# Patient Record
Sex: FEMALE | Race: WHITE | Employment: OTHER | URBAN - METROPOLITAN AREA
[De-identification: names, ages, dates, MRNs, and addresses within clinical notes are randomized per-mention and may not be internally consistent; named-entity substitution may affect disease eponyms.]

---

## 2018-08-27 RX ORDER — ZOLPIDEM TARTRATE 5 MG/1
TABLET ORAL
COMMUNITY
End: 2018-09-04 | Stop reason: SDUPTHER

## 2018-08-27 RX ORDER — ASPIRIN 81 MG/1
TABLET ORAL DAILY
COMMUNITY
End: 2019-06-12

## 2018-08-27 RX ORDER — IPRATROPIUM BROMIDE AND ALBUTEROL SULFATE 2.5; .5 MG/3ML; MG/3ML
3 SOLUTION RESPIRATORY (INHALATION)
COMMUNITY
End: 2018-12-03 | Stop reason: ALTCHOICE

## 2018-08-27 RX ORDER — AMLODIPINE BESYLATE 5 MG/1
5 TABLET ORAL DAILY
COMMUNITY
End: 2018-12-03

## 2018-08-27 RX ORDER — OMEPRAZOLE 20 MG/1
20 CAPSULE, DELAYED RELEASE ORAL DAILY
COMMUNITY
End: 2018-12-03

## 2018-09-04 ENCOUNTER — OFFICE VISIT (OUTPATIENT)
Dept: FAMILY MEDICINE CLINIC | Age: 83
End: 2018-09-04

## 2018-09-04 VITALS
RESPIRATION RATE: 16 BRPM | TEMPERATURE: 97.6 F | OXYGEN SATURATION: 5 % | SYSTOLIC BLOOD PRESSURE: 136 MMHG | DIASTOLIC BLOOD PRESSURE: 70 MMHG | HEART RATE: 68 BPM | WEIGHT: 116 LBS | BODY MASS INDEX: 22.78 KG/M2 | HEIGHT: 60 IN

## 2018-09-04 DIAGNOSIS — E03.9 ACQUIRED HYPOTHYROIDISM: ICD-10-CM

## 2018-09-04 DIAGNOSIS — G47.09 OTHER INSOMNIA: ICD-10-CM

## 2018-09-04 DIAGNOSIS — Z00.00 PREVENTATIVE HEALTH CARE: ICD-10-CM

## 2018-09-04 DIAGNOSIS — E78.5 HYPERLIPIDEMIA, UNSPECIFIED HYPERLIPIDEMIA TYPE: ICD-10-CM

## 2018-09-04 DIAGNOSIS — I10 ESSENTIAL HYPERTENSION: Primary | ICD-10-CM

## 2018-09-04 RX ORDER — ZOLPIDEM TARTRATE 5 MG/1
5 TABLET ORAL
Qty: 30 TAB | Refills: 2 | Status: SHIPPED | OUTPATIENT
Start: 2018-09-04 | End: 2018-12-03 | Stop reason: SDUPTHER

## 2018-09-04 RX ORDER — AMLODIPINE BESYLATE 10 MG/1
10 TABLET ORAL ONCE
COMMUNITY
Start: 2018-08-18 | End: 2021-02-08

## 2018-09-04 RX ORDER — ZOLPIDEM TARTRATE 5 MG/1
TABLET ORAL
COMMUNITY
Start: 2017-05-11 | End: 2018-12-03 | Stop reason: SDUPTHER

## 2018-09-04 RX ORDER — GUAIFENESIN 100 MG/5ML
81 LIQUID (ML) ORAL
COMMUNITY
End: 2018-12-03 | Stop reason: SDUPTHER

## 2018-09-04 RX ORDER — LEVOTHYROXINE SODIUM 25 UG/1
25 TABLET ORAL ONCE
COMMUNITY
Start: 2017-03-08 | End: 2019-04-16 | Stop reason: SDUPTHER

## 2018-09-04 NOTE — PROGRESS NOTES
Kamilah Braga is a 80 y.o. female Chief Complaint Patient presents with  Complete Physical  
 Medication Refill 1. Have you been to the ER, urgent care clinic since your last visit? Hospitalized since your last visit?  no 
 
2. Have you seen or consulted any other health care providers outside of the [address] [place] [address] since your last visit? Include any pap smears or colon screening. [facility])

## 2018-09-04 NOTE — PROGRESS NOTES
[address] [address], [address] Savannah Watters. Hali [place] Phone:  [phone] Fax:  [phone] Annual Wellness Name:  Joe Saleh :  1923 Encounter Date:  2018 HPI: 
Joe Saleh is a 80 y.o. female who presents today for an annual exam to review medications, order labs, discuss if there is any chest pain or shortness of breath, review life style issues and undergo age appropriate screening updates and physical exam and refill medications as appropriate. History reviewed. No pertinent past medical history. Past Surgical History:  
Procedure Laterality Date  HX CHOLECYSTECTOMY  HX GYN Family History Problem Relation Age of Onset  Heart Disease Mother  Stroke Father  Cancer Sister Social History Substance Use Topics  Smoking status: Never Smoker  Smokeless tobacco: Never Used  Alcohol use 0.6 oz/week 1 Glasses of wine per week Current Outpatient Prescriptions Medication Sig Dispense Refill  amLODIPine (NORVASC) 10 mg tablet Take 10 mg by mouth once.  levothyroxine (SYNTHROID) 25 mcg tablet Take 25 mcg by mouth once.  aspirin 81 mg chewable tablet Take 81 mg by mouth.  zolpidem (AMBIEN) 5 mg tablet Take 1 Tab by mouth nightly. Max Daily Amount: 5 mg. 30 Tab 2  
 zolpidem (AMBIEN) 5 mg tablet TK 1 T PO  QHS PRN    
 acetaminophen (TYLENOL 8 HOUR PO) Take  by mouth. Indications: as needed  omeprazole (PRILOSEC) 20 mg capsule Take 20 mg by mouth daily.  dilTIAZem ER (CARDIZEM LA) 120 mg tablet Take 120 mg by mouth daily.  albuterol-ipratropium (DUO-NEB) 2.5 mg-0.5 mg/3 ml nebu 3 mL by Nebulization route.  aspirin delayed-release 81 mg tablet Take  by mouth daily.  amLODIPine (NORVASC) 5 mg tablet Take 5 mg by mouth daily. Allergies Allergen Reactions  Ace Inhibitors Unknown (comments) None noted Patient Active Problem List  
Diagnosis Code  Other insomnia G47.09  
 Essential hypertension I10  
 Acquired hypothyroidism E03.9 Review of Systems: 
Review of Systems Constitutional: Negative for fever. Respiratory: Negative for shortness of breath. Cardiovascular: Negative for chest pain, orthopnea and PND. Gastrointestinal: Negative for abdominal pain, nausea and vomiting. Diet, Lifestyle: No special diet Exercise level: moderately active Physical Exam: 
Physical Exam  
Constitutional: She is oriented to person, place, and time. She appears well-developed and well-nourished. HENT:  
Head: Normocephalic. Eyes: Pupils are equal, round, and reactive to light. Neck: Normal range of motion. Neck supple. No thyromegaly present. Cardiovascular: Normal rate, regular rhythm and normal heart sounds. Exam reveals no friction rub. Pulmonary/Chest: Effort normal and breath sounds normal. No respiratory distress. She has no wheezes. She has no rales. She exhibits no tenderness. Abdominal: Soft. Bowel sounds are normal. She exhibits no distension. There is no tenderness. There is no guarding. Musculoskeletal: Normal range of motion. Neurological: She is alert and oriented to person, place, and time. She has normal reflexes. Skin: Skin is warm and dry. Psychiatric: She has a normal mood and affect. Thought content normal.  
 
 
 
Assessment/Plan Health is stable, life style good, immunizations reviewed and screening discussed and done as per patient's desires. Exercise includes 4 components:  Stretching, core muscle, cardiovascular and balance techniques. Good posture is protective to your back - stand straight as much as possible. Exercise daily of 40 minutes of active exercise encouraged, a balanced diet with portions of fruits, vegetables and salads recommended. Watch sodium intake if high blood pressure is an issue. Labs ordered. If results are not mailed or phoned to you within 2 weeks, please call us. Diagnoses and all orders for this visit: 1. Essential hypertension -     METABOLIC PANEL, COMPREHENSIVE 2. Acquired hypothyroidism 
-     TSH 3RD GENERATION 3. Other insomnia 
-     zolpidem (AMBIEN) 5 mg tablet; Take 1 Tab by mouth nightly. Max Daily Amount: 5 mg. 4. Preventative health care -     CBC W/O DIFF 
-     URINALYSIS W/ RFLX MICROSCOPIC 5. Hyperlipidemia, unspecified hyperlipidemia type -     LIPID PANEL Orders Placed This Encounter  CBC W/O DIFF  
 URINALYSIS W/ RFLX MICROSCOPIC  METABOLIC PANEL, COMPREHENSIVE  
 TSH 3RD GENERATION  
 LIPID PANEL  
 zolpidem (AMBIEN) 5 mg tablet Health Maintenance Due Topic Date Due  
 DTaP/Tdap/Td series (1 - Tdap) 11/06/1944  ZOSTER VACCINE AGE 60>  09/06/1983  GLAUCOMA SCREENING Q2Y  11/06/1988  Pneumococcal 65+ Low/Medium Risk (1 of 2 - PCV13) 11/06/1988  Influenza Age 5 to Adult  08/01/2018

## 2018-09-04 NOTE — PATIENT INSTRUCTIONS
Medicare Wellness Visit, Female The best way to live healthy is to have a lifestyle where you eat a well-balanced diet, exercise regularly, limit alcohol use, and quit all forms of tobacco/nicotine, if applicable. Regular preventive services are another way to keep healthy. Preventive services (vaccines, screening tests, monitoring & exams) can help personalize your care plan, which helps you manage your own care. Screening tests can find health problems at the earliest stages, when they are easiest to treat. [facility] follows the current, evidence-based guidelines published by the [facility] ([facility]) when recommending preventive services for our patients. Because we follow these guidelines, sometimes recommendations change over time as research supports it. (For example, mammograms used to be recommended annually. Even though Medicare will still pay for an annual mammogram, the newer guidelines recommend a mammogram every two years for women of average risk.) Of course, you and your doctor may decide to screen more often for some diseases, based on your risk and your health status. Preventive services for you include: - Medicare offers their members a free annual wellness visit, which is time for you and your primary care provider to discuss and plan for your preventive service needs. Take advantage of this benefit every year! 
-All adults over the age of 72 should receive the recommended pneumonia vaccines. Current USPSTF guidelines recommend a series of two vaccines for the best pneumonia protection.  
-All adults should have a flu vaccine yearly and a tetanus vaccine every 10 years. All adults age 61 and older should receive a shingles vaccine once in their lifetime.   
-A bone mass density test is recommended when a woman turns 65 to screen for osteoporosis. This test is only recommended one time, as a screening. Some providers will use this same test as a disease monitoring tool if you already have osteoporosis. -All adults age 38-68 who are overweight should have a diabetes screening test once every three years.  
-Other screening tests and preventive services for persons with diabetes include: an eye exam to screen for diabetic retinopathy, a kidney function test, a foot exam, and stricter control over your cholesterol.  
-Cardiovascular screening for adults with routine risk involves an electrocardiogram (ECG) at intervals determined by your doctor.  
-Colorectal cancer screenings should be done for adults age 54-65 with no increased risk factors for colorectal cancer. There are a number of acceptable methods of screening for this type of cancer. Each test has its own benefits and drawbacks. Discuss with your doctor what is most appropriate for you during your annual wellness visit. The different tests include: colonoscopy (considered the best screening method), a fecal occult blood test, a fecal DNA test, and sigmoidoscopy. -Breast cancer screenings are recommended every other year for women of normal risk, age 54-69. 
-Cervical cancer screenings for women over age 72 are only recommended with certain risk factors.  
-All adults born between [facility] should be screened once for Hepatitis C. Here is a list of your current Health Maintenance items (your personalized list of preventive services) with a due date: 
Health Maintenance Due Topic Date Due  
 DTaP/Tdap/Td  (1 - Tdap) 11/06/1944  Shingles Vaccine  09/06/1983  Glaucoma Screening   11/06/1988  Pneumococcal Vaccine (1 of 2 - PCV13) 11/06/1988  Flu Vaccine  08/01/2018

## 2018-09-07 LAB
ALBUMIN SERPL-MCNC: 4.3 G/DL (ref 3.2–4.6)
ALBUMIN/GLOB SERPL: 1.7 {RATIO} (ref 1.2–2.2)
ALP SERPL-CCNC: 85 IU/L (ref 39–117)
ALT SERPL-CCNC: 19 IU/L (ref 0–32)
APPEARANCE UR: CLEAR
AST SERPL-CCNC: 21 IU/L (ref 0–40)
BACTERIA #/AREA URNS HPF: ABNORMAL /[HPF]
BILIRUB SERPL-MCNC: 0.7 MG/DL (ref 0–1.2)
BILIRUB UR QL STRIP: NEGATIVE
BUN SERPL-MCNC: 14 MG/DL (ref 10–36)
BUN/CREAT SERPL: 25 (ref 12–28)
CALCIUM SERPL-MCNC: 9.4 MG/DL (ref 8.7–10.3)
CASTS URNS QL MICRO: ABNORMAL /LPF
CHLORIDE SERPL-SCNC: 99 MMOL/L (ref 96–106)
CHOLEST SERPL-MCNC: 140 MG/DL (ref 100–199)
CO2 SERPL-SCNC: 26 MMOL/L (ref 20–29)
COLOR UR: YELLOW
CREAT SERPL-MCNC: 0.56 MG/DL (ref 0.57–1)
CRYSTALS URNS MICRO: ABNORMAL
EPI CELLS #/AREA URNS HPF: ABNORMAL /HPF
ERYTHROCYTE [DISTWIDTH] IN BLOOD BY AUTOMATED COUNT: 13.7 % (ref 12.3–15.4)
GLOBULIN SER CALC-MCNC: 2.5 G/DL (ref 1.5–4.5)
GLUCOSE SERPL-MCNC: 88 MG/DL (ref 65–99)
GLUCOSE UR QL: NEGATIVE
HCT VFR BLD AUTO: 40.4 % (ref 34–46.6)
HDLC SERPL-MCNC: 41 MG/DL
HGB BLD-MCNC: 13.1 G/DL (ref 11.1–15.9)
HGB UR QL STRIP: NEGATIVE
KETONES UR QL STRIP: NEGATIVE
LDLC SERPL CALC-MCNC: 66 MG/DL (ref 0–99)
LEUKOCYTE ESTERASE UR QL STRIP: ABNORMAL
MCH RBC QN AUTO: 30.1 PG (ref 26.6–33)
MCHC RBC AUTO-ENTMCNC: 32.4 G/DL (ref 31.5–35.7)
MCV RBC AUTO: 93 FL (ref 79–97)
MICRO URNS: ABNORMAL
MUCOUS THREADS URNS QL MICRO: PRESENT
NITRITE UR QL STRIP: NEGATIVE
PH UR STRIP: 7 [PH] (ref 5–7.5)
PLATELET # BLD AUTO: 327 X10E3/UL (ref 150–379)
POTASSIUM SERPL-SCNC: 4.2 MMOL/L (ref 3.5–5.2)
PROT SERPL-MCNC: 6.8 G/DL (ref 6–8.5)
PROT UR QL STRIP: NEGATIVE
RBC # BLD AUTO: 4.35 X10E6/UL (ref 3.77–5.28)
RBC #/AREA URNS HPF: ABNORMAL /HPF
SODIUM SERPL-SCNC: 141 MMOL/L (ref 134–144)
SP GR UR: 1.01 (ref 1–1.03)
TRIGL SERPL-MCNC: 166 MG/DL (ref 0–149)
TSH SERPL DL<=0.005 MIU/L-ACNC: 3.13 UIU/ML (ref 0.45–4.5)
UNIDENT CRYS URNS QL MICRO: PRESENT
UROBILINOGEN UR STRIP-MCNC: 0.2 MG/DL (ref 0.2–1)
VLDLC SERPL CALC-MCNC: 33 MG/DL (ref 5–40)
WBC # BLD AUTO: 6.5 X10E3/UL (ref 3.4–10.8)
WBC #/AREA URNS HPF: ABNORMAL /HPF

## 2018-09-11 ENCOUNTER — TELEPHONE (OUTPATIENT)
Dept: FAMILY MEDICINE CLINIC | Age: 83
End: 2018-09-11

## 2018-09-11 NOTE — TELEPHONE ENCOUNTER
Need new order for DEXA with diagnosis code on it sent to [facility]. Per daughter please call pt to confirm it has been done @ [phone].

## 2018-09-28 ENCOUNTER — TELEPHONE (OUTPATIENT)
Dept: FAMILY MEDICINE CLINIC | Age: 83
End: 2018-09-28

## 2018-12-03 ENCOUNTER — OFFICE VISIT (OUTPATIENT)
Dept: FAMILY MEDICINE CLINIC | Age: 83
End: 2018-12-03

## 2018-12-03 VITALS
SYSTOLIC BLOOD PRESSURE: 146 MMHG | HEART RATE: 62 BPM | TEMPERATURE: 97.4 F | RESPIRATION RATE: 16 BRPM | HEIGHT: 60 IN | BODY MASS INDEX: 22.58 KG/M2 | WEIGHT: 115 LBS | DIASTOLIC BLOOD PRESSURE: 75 MMHG | OXYGEN SATURATION: 98 %

## 2018-12-03 DIAGNOSIS — I10 ESSENTIAL HYPERTENSION: ICD-10-CM

## 2018-12-03 DIAGNOSIS — G47.09 OTHER INSOMNIA: Primary | ICD-10-CM

## 2018-12-03 RX ORDER — FAMOTIDINE 20 MG/1
10 TABLET, FILM COATED ORAL
COMMUNITY

## 2018-12-03 RX ORDER — ZOLPIDEM TARTRATE 5 MG/1
5 TABLET ORAL
Qty: 30 TAB | Refills: 2 | Status: SHIPPED | OUTPATIENT
Start: 2018-12-03 | End: 2019-03-04 | Stop reason: SDUPTHER

## 2018-12-03 RX ORDER — IBUPROFEN 200 MG
400 TABLET ORAL
COMMUNITY
End: 2018-12-03

## 2018-12-03 RX ORDER — ACETAMINOPHEN 325 MG/1
650 TABLET ORAL
COMMUNITY
End: 2021-01-12

## 2018-12-03 RX ORDER — COLESEVELAM 180 1/1
1350 TABLET ORAL
COMMUNITY
Start: 2017-02-26 | End: 2018-12-03

## 2018-12-03 RX ORDER — LOPERAMIDE HYDROCHLORIDE 2 MG/1
2 CAPSULE ORAL
COMMUNITY
End: 2018-12-03

## 2018-12-03 NOTE — PROGRESS NOTES
I have reviewed the notes, assessments, and/or procedures performed, I concur with the residents documentation of [place].

## 2018-12-03 NOTE — PROGRESS NOTES
Misael Rene is a 80 y.o. female who had concerns including Blood Pressure Check (hypertension) and Medication Refill Tea Garner). Insomnia  Patient presents today for Insomnia. Patient currently taking Ambien 5mg nightly. She has had insomnia for many years following the death of her  and son. She takes medication consistently and as prescried. She usually gets about 7 hours of sleep. She usually goes to bed about 11pm and gets up around 7 pm. She has trouble initiating sleep and occasionally wakes up at night. HTN  Patient presents today for HTN follow-up. Currently taking Norvasc 10mg Taking medications daily w/o complications. She does not check her blood pressure at home. She does follow-up with cardiology for her a.fib. .       ROS: (positive in bold)  General: wt loss, fever, chills, fatigue   Cardiac: chest pain  Pul: SOB  GI: abdominal pain, N&V, diarrhea, constipation   Neuro:  headache, lightheaded, dizziness. Psych: anxiety, depression, Insomnia    Past Medical History:  Past Medical History:   Diagnosis Date    Anxiety     [facility]rial [facility] [address]     Dysphagia     Insomnia     contract signed 11/2017    Macular degeneration     Osteoarthritis     Pancreatitis 2010    Pneumonia 2016    PVD (peripheral vascular disease) ([facility] [address]     SIADH (syndrome of inappropriate ADH production) ([facility])     Vertigo        Past Surgical History:  Past Surgical History:   Procedure Laterality Date    HX CHOLECYSTECTOMY      HX GYN         Family History:  Family History   Problem Relation Age of Onset    Heart Disease Mother     Stroke Father     Cancer Sister        Allergies: Allergies   Allergen Reactions    Lisinopril Other (comments)     Edema      Ace Inhibitors Unknown (comments)     None noted       Social History:  Social History     Tobacco Use    Smoking status: Never Smoker    Smokeless tobacco: Never Used   Substance Use Topics    Alcohol use:  Yes     Alcohol/week: 0.6 oz Types: 1 Glasses of wine per week    Drug use: No       Current Meds:  Current Outpatient Medications on File Prior to Visit   Medication Sig Dispense Refill    famotidine (PEPCID) 20 mg tablet 20 mg.      acetaminophen (TYLENOL) 325 mg tablet 650 mg.      amLODIPine (NORVASC) 10 mg tablet Take 10 mg by mouth once.  levothyroxine (SYNTHROID) 25 mcg tablet Take 25 mcg by mouth once.  aspirin delayed-release 81 mg tablet Take  by mouth daily. No current facility-administered medications on file prior to visit. Visit Vitals  /75 (BP 1 Location: Right arm, BP Patient Position: Sitting)   Pulse 62   Temp 97.4 °F (36.3 °C) (Oral)   Resp 16   Ht 5' (1.524 m)   Wt 115 lb (52.2 kg)   LMP  (LMP Unknown)   SpO2 98%   BMI 22.46 kg/m²       Gen:  Well developed, well nourished female in no acute distress  HEENT: normocephalic/atraumatic;EOMi  Neck:   Supple, no lympadenopathy, no thyromegaly  Card:  RRR, no m/r/g  Chest:  CTAB, no w/r/r  Abd:  BS+, Soft, nontender/nondistended  Neuro: AAO X 3  Psych:  Nl mood and affect     Assessment:      ICD-10-CM ICD-9-CM    1. Other insomnia G47.09 780.52 zolpidem (AMBIEN) 5 mg tablet   2. Essential hypertension I10 401.9       1. Insomina  Currently on Ambien 5mg nightly. Taking medication as prescribed. Helps her initiate sleep.  was reviewed and appropirate w/o red flags. Discussed side effects of medication. Will consider slowly weaning in the future. Refill of Ambien 5mg with 2 refills. 2. HTN  Stable for age. Continue current medications. No changes at this time. Labs checked in 9/18 and stable. BP goal discussed with patient. Will continue to monitor. I have discussed the diagnosis with the patient and the intended plan as seen in the above orders. The patient has received an after-visit summary and questions were answered concerning future plans. I have discussed medication side effects and warnings with the patient as well.  The patient agrees and understands above plan. Follow-up Disposition:  Return in about 3 months (around 3/3/2019) for Insomnia. Patient discussed with supervising attending.     Siddharth Linares, DO

## 2018-12-03 NOTE — PATIENT INSTRUCTIONS

## 2018-12-03 NOTE — PROGRESS NOTES
Identified pt with two pt identifiers(name and ). Chief Complaint   Patient presents with    Blood Pressure Check     hypertension    Medication Refill        Health Maintenance Due   Topic    DTaP/Tdap/Td series (1 - Tdap)    Shingrix Vaccine Age 50> (1 of 2)    GLAUCOMA SCREENING Q2Y     Pneumococcal 65+ Low/Medium Risk (1 of 2 - PCV13)       Wt Readings from Last 3 Encounters:   18 115 lb (52.2 kg)   18 116 lb (52.6 kg)     Temp Readings from Last 3 Encounters:   18 97.6 °F (36.4 °C) (Oral)     BP Readings from Last 3 Encounters:   18 136/70     Pulse Readings from Last 3 Encounters:   18 68         Learning Assessment:  :     Learning Assessment 2018   PRIMARY LEARNER Patient   HIGHEST LEVEL OF EDUCATION - PRIMARY LEARNER  4 YEARS OF COLLEGE   PRIMARY LANGUAGE ENGLISH   LEARNER PREFERENCE PRIMARY READING   ANSWERED BY self   RELATIONSHIP SELF       Depression Screening:  :     PHQ over the last two weeks 2018   Little interest or pleasure in doing things Not at all   Feeling down, depressed, irritable, or hopeless Not at all   Total Score PHQ 2 0       Fall Risk Assessment:  :     Fall Risk Assessment, last 12 mths 2018   Able to walk? Yes   Fall in past 12 months? No       Abuse Screening:  :     Abuse Screening Questionnaire 2018   Do you ever feel afraid of your partner? N   Are you in a relationship with someone who physically or mentally threatens you? N       Coordination of Care Questionnaire:  :     1) Have you been to an emergency room, urgent care clinic since your last visit? no   Hospitalized since your last visit? no             2) Have you seen or consulted any other health care providers outside of [address] [place] [address] since your last visit? no  (Include any pap smears or colon screenings in this section.)    3) Do you have an Advance Directive on file? no  Are you interested in receiving information about Advance Directives? no    Patient is accompanied by self I have received verbal consent from Sukhdev Gray to discuss any/all medical information while they are present in the room. Reviewed record in preparation for visit and have obtained necessary documentation. Medication reconciliation up to date and corrected with patient at this time.

## 2019-03-04 ENCOUNTER — OFFICE VISIT (OUTPATIENT)
Dept: FAMILY MEDICINE CLINIC | Age: 84
End: 2019-03-04

## 2019-03-04 VITALS
DIASTOLIC BLOOD PRESSURE: 74 MMHG | HEART RATE: 62 BPM | TEMPERATURE: 98.2 F | RESPIRATION RATE: 18 BRPM | SYSTOLIC BLOOD PRESSURE: 152 MMHG | OXYGEN SATURATION: 97 % | WEIGHT: 114 LBS | BODY MASS INDEX: 22.38 KG/M2 | HEIGHT: 60 IN

## 2019-03-04 DIAGNOSIS — Z79.899 LONG-TERM USE OF HIGH-RISK MEDICATION: ICD-10-CM

## 2019-03-04 DIAGNOSIS — G47.09 OTHER INSOMNIA: Primary | ICD-10-CM

## 2019-03-04 DIAGNOSIS — I10 ESSENTIAL HYPERTENSION: ICD-10-CM

## 2019-03-04 RX ORDER — ZOLPIDEM TARTRATE 5 MG/1
5 TABLET ORAL
Qty: 30 TAB | Refills: 0 | Status: SHIPPED | OUTPATIENT
Start: 2019-05-04 | End: 2019-04-26 | Stop reason: SDUPTHER

## 2019-03-04 RX ORDER — ZOLPIDEM TARTRATE 5 MG/1
5 TABLET ORAL
Qty: 30 TAB | Refills: 0 | Status: SHIPPED | OUTPATIENT
Start: 2019-03-04 | End: 2019-04-03

## 2019-03-04 RX ORDER — ZOLPIDEM TARTRATE 5 MG/1
5 TABLET ORAL
Qty: 30 TAB | Refills: 0 | Status: SHIPPED | OUTPATIENT
Start: 2019-04-04 | End: 2019-05-04

## 2019-03-04 NOTE — PATIENT INSTRUCTIONS

## 2019-03-04 NOTE — PROGRESS NOTES
Sukhdev Lopez is a 80 y.o. female who had concerns including Hypertension (follow up) and Insomnia (ambien refill). Insomnia  Patient currently taking Ambien 5mg nightly. She has had insomnia for many years. She developed insomnia following the death of her  and son. She takes medication consistently and as prescribed. She reports 7-9 hours per sleep at night. She states that she has trouble initiating sleep and occasionally wakes up at night. HTN  Patient presents today for HTN follow-up. Currently taking Norvasc 10mg. Taking medications daily w/o complications. Home BP readings range from 130-167/67-78. Patient  trying to follow a low salt diet. ROS: (positive in bold)  General: wt loss, fever, chills, fatigue, insomnia   Cardiac: chest pain, palpitations  Pul: SOB  Neuro: headache, lightheaded, dizziness. Past Medical History: (reviewed)  Past Medical History:   Diagnosis Date    Anxiety     At[facility] fibrillation ([facility] [address]     Dysphagia     Insomnia     contract signed 11/2017    Macular degeneration     Osteoarthritis     Pancreatitis 2010    Pneumonia 2016    PVD (peripheral vascular disease) ([facility] [address]     SIADH (syndrome of inappropriate ADH production) ([facility])     Vertigo        Past Surgical History:  Past Surgical History:   Procedure Laterality Date    HX CHOLECYSTECTOMY      HX GYN         Family History:  Family History   Problem Relation Age of Onset    Heart Disease Mother     Stroke Father     Cancer Sister        Allergies: Allergies   Allergen Reactions    Lisinopril Other (comments)     Edema      Ace Inhibitors Unknown (comments)     None noted       Social History:  Social History     Tobacco Use    Smoking status: Never Smoker    Smokeless tobacco: Never Used   Substance Use Topics    Alcohol use:  Yes     Alcohol/week: 0.6 oz     Types: 1 Glasses of wine per week    Drug use: No       Current Meds:  Current Outpatient Medications on File Prior to Visit Medication Sig Dispense Refill    vit A/vit C/vit E/zinc/copper (PRESERVISION AREDS PO) Take  by mouth.  propylene glycol/peg 400/PF (SYSTANE, PF, OP) Apply  to eye.  MULTIVITAMIN PO Take  by mouth.  famotidine (PEPCID) 20 mg tablet 20 mg.      acetaminophen (TYLENOL) 325 mg tablet 650 mg.      amLODIPine (NORVASC) 10 mg tablet Take 10 mg by mouth once.  levothyroxine (SYNTHROID) 25 mcg tablet Take 25 mcg by mouth once.  aspirin delayed-release 81 mg tablet Take  by mouth daily. No current facility-administered medications on file prior to visit. Visit Vitals  /74 (BP 1 Location: Left arm, BP Patient Position: Sitting)   Pulse 62   Temp 98.2 °F (36.8 °C) (Oral)   Resp 18   Ht 5' (1.524 m)   Wt 114 lb (51.7 kg)   LMP  (LMP Unknown)   SpO2 97%   BMI 22.26 kg/m²       Gen:  Well developed, well nourished female in no acute distress  Card:  RRR, no m/r/g  Chest:  CTAB, no w/r/r  Abd:  BS+, Soft, nontender/nondistended  Psych:  Nl mood and affect     Assessment/Plan:      ICD-10-CM ICD-9-CM    1. Other insomnia G47.09 780.52 zolpidem (AMBIEN) 5 mg tablet      zolpidem (AMBIEN) 5 mg tablet      zolpidem (AMBIEN) 5 mg tablet      DRUG SCREEN, URINE - IMMUNOASSAY 9 W/REFLEX CONFIRM   2. Essential hypertension I10 401.9    3. Long-term use of high-risk medication Z79.899 V58.69 DRUG SCREEN, URINE - IMMUNOASSAY 9 W/REFLEX CONFIRM        Insomina  Currently on Ambien 5mg nightly. Taking medication as prescribed. Helps her initiate sleep.  was reviewed and appropirate w/o red flags. Discussed side effects of medication. Sleep hygiene discusses with patient. Will check UDS. Pain contract signed with Dr. Mahsa Reardon. Given her age she is at risk of adverse effects to medication. Will consider slowly weaning in the future. Refill of Ambien 5mg with 2 refills. Hypertension:  Stable for age. Continue current medications. No changes at this time. BP goal discussed with patient. Encouraged well balanced diet with low sodium intake, and setting a goal of 150 minutes of exercise per week. I have discussed the diagnosis with the patient and the intended plan as seen in the above orders. The patient has received an after-visit summary and questions were answered concerning future plans. I have discussed medication side effects and warnings with the patient as well. The patient agrees and understands above plan. Follow-up Disposition:  Return in about 3 months (around 6/4/2019). Patient discussed with supervising attending.     Lorraine Navarro DO

## 2019-03-25 ENCOUNTER — TELEPHONE (OUTPATIENT)
Dept: FAMILY MEDICINE CLINIC | Age: 84
End: 2019-03-25

## 2019-03-25 NOTE — TELEPHONE ENCOUNTER
Patient requesting prescription for Lomotil for severe diarrhea. Stating that over the counter medication is not helping her.

## 2019-04-16 RX ORDER — LEVOTHYROXINE SODIUM 25 UG/1
25 TABLET ORAL
Qty: 90 TAB | Refills: 0 | Status: SHIPPED | OUTPATIENT
Start: 2019-04-16 | End: 2019-07-12 | Stop reason: SDUPTHER

## 2019-04-26 ENCOUNTER — OFFICE VISIT (OUTPATIENT)
Dept: FAMILY MEDICINE CLINIC | Age: 84
End: 2019-04-26

## 2019-04-26 VITALS
HEART RATE: 59 BPM | TEMPERATURE: 98.1 F | RESPIRATION RATE: 16 BRPM | DIASTOLIC BLOOD PRESSURE: 70 MMHG | SYSTOLIC BLOOD PRESSURE: 156 MMHG | BODY MASS INDEX: 22.58 KG/M2 | OXYGEN SATURATION: 96 % | HEIGHT: 60 IN | WEIGHT: 115 LBS

## 2019-04-26 DIAGNOSIS — Z09 HOSPITAL DISCHARGE FOLLOW-UP: Primary | ICD-10-CM

## 2019-04-26 RX ORDER — ZOLPIDEM TARTRATE 5 MG/1
TABLET ORAL
COMMUNITY
End: 2019-04-26 | Stop reason: SDUPTHER

## 2019-04-26 RX ORDER — AMLODIPINE BESYLATE 10 MG/1
TABLET ORAL
COMMUNITY
End: 2019-04-26 | Stop reason: SDUPTHER

## 2019-04-26 RX ORDER — LEVOTHYROXINE SODIUM 25 UG/1
TABLET ORAL
COMMUNITY
End: 2019-04-26 | Stop reason: SDUPTHER

## 2019-04-26 RX ORDER — GUAIFENESIN 100 MG/5ML
LIQUID (ML) ORAL
COMMUNITY
End: 2019-04-26 | Stop reason: SDUPTHER

## 2019-04-26 NOTE — PROGRESS NOTES
Assessment/Plan:     Diagnoses and all orders for this visit:    1. Hospital discharge follow-up  - Improving since fall, minimal pain. Discussed the symptoms of concussion. If pain in the pelvis begins to increase consider PT. Discussed the reasons to come back to clinic. RTC in June as discussed            Discussed expected course/resolution/complications of diagnosis in detail with patient.    Medication risks/benefits/costs/interactions/alternatives discussed with patient.    Pt was given after visit summary which includes diagnoses, current medications & vitals. Pt expressed understanding with the diagnosis and plan        Subjective:      Quirino Guzman is a 80 y.o. female who presents for had concerns including Fall (Went to [address] [place] [address] 4/15/19  ( fracture tailbone ) ). Here today for follow up on fall. Carol Jimenez on 4/19/19, fell back on steps and hit her head. Went to ortho on call found to have some swelling on the outside of skull so sent to [address] [place] [address] ER.  CT of head was normal.  CT of abdomen ok, found fracture of coccyx, XR did not  the fracture. Given order for PT. Today she states doing much better, pain is less each day. She has not started PT yet. Current Outpatient Medications   Medication Sig Dispense Refill    levothyroxine (SYNTHROID) 25 mcg tablet Take 1 Tab by mouth Daily (before breakfast). 90 Tab 0    vit A/vit C/vit E/zinc/copper (PRESERVISION AREDS PO) Take  by mouth.  MULTIVITAMIN PO Take  by mouth.  zolpidem (AMBIEN) 5 mg tablet Take 1 Tab by mouth nightly as needed for Sleep for up to 30 days. Max Daily Amount: 5 mg. 30 Tab 0    famotidine (PEPCID) 20 mg tablet 20 mg.      acetaminophen (TYLENOL) 325 mg tablet 650 mg.      amLODIPine (NORVASC) 10 mg tablet Take 10 mg by mouth once.  aspirin delayed-release 81 mg tablet Take  by mouth daily.          Allergies   Allergen Reactions    Ace Inhibitors Unknown (comments), Hives and Rash     None noted  None noted  Edema    Lisinopril Other (comments)     Edema         ROS:   Review of Systems   Constitutional: Negative for fever and malaise/fatigue. Respiratory: Negative for cough and shortness of breath. Cardiovascular: Negative for chest pain, palpitations and leg swelling. Musculoskeletal: Negative for back pain, falls and joint pain. Neurological: Negative for dizziness and headaches. Objective:     Visit Vitals  /70 (BP 1 Location: Right arm, BP Patient Position: Sitting)   Pulse (!) 59   Temp 98.1 °F (36.7 °C) (Oral)   Resp 16   Ht 5' (1.524 m)   Wt 115 lb (52.2 kg)   LMP  (LMP Unknown)   SpO2 96%   BMI 22.46 kg/m²       Vitals and Nurse Documentation reviewed. Physical Exam   Constitutional: She is well-developed, well-nourished, and in no distress. No distress. HENT:   Head: Normocephalic and atraumatic. Cardiovascular: Normal rate, regular rhythm and normal heart sounds. Exam reveals no gallop and no friction rub. No murmur heard. Pulmonary/Chest: Effort normal and breath sounds normal. No respiratory distress. She has no wheezes. She has no rales. Neurological: She is alert. Skin: She is not diaphoretic.    Psychiatric: Affect normal.       Results for orders placed or performed in visit on 09/04/18   CBC W/O DIFF   Result Value Ref Range    WBC 6.5 3.4 - 10.8 x10E3/uL    RBC 4.35 3.77 - 5.28 x10E6/uL    HGB 13.1 11.1 - 15.9 g/dL    HCT 40.4 34.0 - 46.6 %    MCV 93 79 - 97 fL    MCH 30.1 26.6 - 33.0 pg    MCHC 32.4 31.5 - 35.7 g/dL    RDW 13.7 12.3 - 15.4 %    PLATELET 009 255 - 800 x10E3/uL   URINALYSIS W/ RFLX MICROSCOPIC   Result Value Ref Range    Specific Gravity 1.011 1.005 - 1.030    pH (UA) 7.0 5.0 - 7.5    Color Yellow Yellow    Appearance Clear Clear    Leukocyte Esterase 3+ (A) Negative    Protein Negative Negative/Trace    Glucose Negative Negative    Ketone Negative Negative    Blood Negative Negative    Bilirubin Negative Negative    Urobilinogen 0.2 0.2 - 1.0 mg/dL    Nitrites Negative Negative    Microscopic Examination See additional order    METABOLIC PANEL, COMPREHENSIVE   Result Value Ref Range    Glucose 88 65 - 99 mg/dL    BUN 14 10 - 36 mg/dL    Creatinine 0.56 (L) 0.57 - 1.00 mg/dL    GFR est non-AA 80 >59 mL/min/1.73    GFR est AA 92 >59 mL/min/1.73    BUN/Creatinine ratio 25 12 - 28    Sodium 141 134 - 144 mmol/L    Potassium 4.2 3.5 - 5.2 mmol/L    Chloride 99 96 - 106 mmol/L    CO2 26 20 - 29 mmol/L    Calcium 9.4 8.7 - 10.3 mg/dL    Protein, total 6.8 6.0 - 8.5 g/dL    Albumin 4.3 3.2 - 4.6 g/dL    GLOBULIN, TOTAL 2.5 1.5 - 4.5 g/dL    A-G Ratio 1.7 1.2 - 2.2    Bilirubin, total 0.7 0.0 - 1.2 mg/dL    Alk. phosphatase 85 39 - 117 IU/L    AST (SGOT) 21 0 - 40 IU/L    ALT (SGPT) 19 0 - 32 IU/L   TSH 3RD GENERATION   Result Value Ref Range    TSH 3.130 0.450 - 4.500 uIU/mL   LIPID PANEL   Result Value Ref Range    Cholesterol, total 140 100 - 199 mg/dL    Triglyceride 166 (H) 0 - 149 mg/dL    HDL Cholesterol 41 >39 mg/dL    VLDL, calculated 33 5 - 40 mg/dL    LDL, calculated 66 0 - 99 mg/dL   MICROSCOPIC EXAMINATION   Result Value Ref Range    WBC 11-30 (A) 0 - 5 /hpf    RBC 0-2 0 - 2 /hpf    Epithelial cells 0-10 0 - 10 /hpf    Casts None seen None seen /lpf    Crystals Present (A) N/A    Crystal type Amorphous Sediment N/A    Mucus Present Not Estab.     Bacteria Few None seen/Few

## 2019-04-26 NOTE — PROGRESS NOTES
Elliot Pacheco is a 80 y.o. female      Chief Complaint   Patient presents with    Amanda Jennings to [address] [place] [address] 4/15/19  ( fracture tailbone )          1. Have you been to the ER, urgent care clinic since your last visit? Hospitalized since your last visit? Yes JW  Fall 4/15/19    2. Have you seen or consulted any other health care providers outside of the [address] [place] [address] since your last visit? Include any pap smears or colon screening.   no

## 2019-06-12 ENCOUNTER — OFFICE VISIT (OUTPATIENT)
Dept: FAMILY MEDICINE CLINIC | Age: 84
End: 2019-06-12

## 2019-06-12 VITALS
HEART RATE: 74 BPM | SYSTOLIC BLOOD PRESSURE: 146 MMHG | WEIGHT: 112 LBS | BODY MASS INDEX: 21.99 KG/M2 | HEIGHT: 60 IN | DIASTOLIC BLOOD PRESSURE: 75 MMHG | TEMPERATURE: 97.4 F | OXYGEN SATURATION: 100 % | RESPIRATION RATE: 12 BRPM

## 2019-06-12 DIAGNOSIS — I10 ESSENTIAL HYPERTENSION: Primary | ICD-10-CM

## 2019-06-12 DIAGNOSIS — E03.9 ACQUIRED HYPOTHYROIDISM: ICD-10-CM

## 2019-06-12 DIAGNOSIS — G47.09 OTHER INSOMNIA: ICD-10-CM

## 2019-06-12 RX ORDER — ZOLPIDEM TARTRATE 10 MG/1
TABLET ORAL
COMMUNITY
End: 2019-06-12 | Stop reason: ALTCHOICE

## 2019-06-12 RX ORDER — APIXABAN 2.5 MG/1
TABLET, FILM COATED ORAL
Refills: 3 | COMMUNITY
Start: 2019-05-20

## 2019-06-12 NOTE — PROGRESS NOTES
Chief Complaint   Patient presents with    Sleep Problem     Pt state she can't stay asleep. She has been off of her Ambiem for about a week.  LOW BACK PAIN     Pt state she is still having alot of pain in her lower back.  Other     Want to discuss her CT scan of her head she had in April. 1. Have you been to the ER, urgent care clinic since your last visit? Hospitalized since your last visit? Yes When: April 2019    2. Have you seen or consulted any other health care providers outside of the [address] [place] [address] since your last visit? Include any pap smears or colon screening.  No

## 2019-06-12 NOTE — PROGRESS NOTES
Assessment and Plan    1. Essential hypertension  At goal on amlodipine only  - METABOLIC PANEL, BASIC    2. Acquired hypothyroidism  For labs  - TSH 3RD GENERATION    3. Insomnia  Do not recommend continuing ambien with recent fall especially at 10 mg dose. 4. Low back pain   stable after fall and fracture of sacrum    Follow-up and Dispositions    · Return in about 6 months (around 12/12/2019) for Blood pressure follow up. Diagnosis and plan discussed with patient who verbillized understanding. History of present illness:Addis Liang is a 80 y.o. female presenting for Sleep Problem (Pt state she can't stay asleep. She has been off of her Ambiem for about a week.); LOW BACK PAIN (Pt state she is still having alot of pain in her lower back.); and Other (Want to discuss her CT scan of her head she had in April.)    Lita Green in April and had fracture sacral ala  Normal CT of head. Still some back pain. Also having some swelling and ongoing pain of left ankle. No injury there but has chronic swelling     On Ambien off and on for several years. Should she continue it  Trouble sleeping off of that med      Review of Systems   Respiratory: Negative. Cardiovascular: Negative. Musculoskeletal: Positive for back pain and joint pain. Neurological: Negative for dizziness and headaches.          Past Medical History:   Diagnosis Date    Anxiety     Atrial fibrillation ([address]     Dysphagia     Insomnia     contract signed 11/2017    Macular degeneration     Osteoarthritis     Pancreatitis 2010    Pneumonia 2016    PVD (peripheral vascular disease) ([address]     SIADH (syndrome of inappropriate ADH production) ([facility])     Vertigo      Past Surgical History:   Procedure Laterality Date    HX CHOLECYSTECTOMY      HX GYN       Family History   Problem Relation Age of Onset    Heart Disease Mother     Stroke Father     Cancer Sister      Social History     Socioeconomic History    Marital status:      Spouse name: Not on file    Number of children: Not on file    Years of education: Not on file    Highest education level: Not on file   Occupational History    Not on file   Social Needs    Financial resource strain: Not on file    Food insecurity:     Worry: Not on file     Inability: Not on file    Transportation needs:     Medical: Not on file     Non-medical: Not on file   Tobacco Use    Smoking status: Never Smoker    Smokeless tobacco: Never Used   Substance and Sexual Activity    Alcohol use: Yes     Alcohol/week: 0.6 oz     Types: 1 Glasses of wine per week    Drug use: No    Sexual activity: Never   Lifestyle    Physical activity:     Days per week: Not on file     Minutes per session: Not on file    Stress: Not on file   Relationships    Social connections:     Talks on phone: Not on file     Gets together: Not on file     Attends Shinto service: Not on file     Active member of club or organization: Not on file     Attends meetings of clubs or organizations: Not on file     Relationship status: Not on file    Intimate partner violence:     Fear of current or ex partner: Not on file     Emotionally abused: Not on file     Physically abused: Not on file     Forced sexual activity: Not on file   Other Topics Concern    Not on file   Social History Narrative    Not on file         Current Outpatient Medications   Medication Sig Dispense Refill    ELIQUIS 2.5 mg tablet TK UTD PO  BID  3    peg 400-propylene glycol (SYSTANE, PROPYLENE GLYCOL,) 0.4-0.3 % drop 1 Drop two (2) times a day.  zolpidem (AMBIEN) 10 mg tablet Take  by mouth nightly as needed for Sleep.  levothyroxine (SYNTHROID) 25 mcg tablet Take 1 Tab by mouth Daily (before breakfast). 90 Tab 0    vit A/vit C/vit E/zinc/copper (PRESERVISION AREDS PO) Take  by mouth.  MULTIVITAMIN PO Take  by mouth.       famotidine (PEPCID) 20 mg tablet 20 mg.      acetaminophen (TYLENOL) 325 mg tablet 650 mg.     [employer] amLODIPine (NORVASC) 10 mg tablet Take 10 mg by mouth once. Allergies   Allergen Reactions    Ace Inhibitors Unknown (comments), Hives and Rash     None noted  None noted  Edema    Lisinopril Other (comments)     Edema         Vitals:    06/12/19 1412   BP: 146/75   Pulse: 74   Resp: 12   Temp: 97.4 °F (36.3 °C)   TempSrc: Oral   SpO2: 100%   Weight: 112 lb (50.8 kg)   Height: 5' (1.524 m)     Body mass index is 21.87 kg/m². Objective  General: Patient alert and oriented and in NAD  Neck: No thyromegaly or cervical lymphadenopathy  Cardiovascular: Heart has regular rate and rhythm, No murmurs, rubs or gallops. No edema  Respiratory: Lungs are clear to auscultation bilaterally, no wheezing, rales or rhonchi, normal chest excursion and no increased work of breathing. Musculoskeletal: All four extremities present and functional.  Minimal edema left ankle, nontender, pain free ROM  Skin: No rashes or lesions noted on exposed skin  Neuro: AAOx3, normal gait and speech. No gross neurologic deficits. Psych: Appropriate mood and affect, no homicidal or suicidal ideation, no obsessions, delusions or hallucinations, normal psychomotor status.

## 2019-06-13 LAB
BUN SERPL-MCNC: 16 MG/DL (ref 10–36)
BUN/CREAT SERPL: 27 (ref 12–28)
CALCIUM SERPL-MCNC: 9.2 MG/DL (ref 8.7–10.3)
CHLORIDE SERPL-SCNC: 101 MMOL/L (ref 96–106)
CO2 SERPL-SCNC: 28 MMOL/L (ref 20–29)
CREAT SERPL-MCNC: 0.6 MG/DL (ref 0.57–1)
GLUCOSE SERPL-MCNC: 102 MG/DL (ref 65–99)
POTASSIUM SERPL-SCNC: 3.9 MMOL/L (ref 3.5–5.2)
SODIUM SERPL-SCNC: 139 MMOL/L (ref 134–144)
TSH SERPL DL<=0.005 MIU/L-ACNC: 1.58 UIU/ML (ref 0.45–4.5)

## 2019-07-13 RX ORDER — LEVOTHYROXINE SODIUM 25 UG/1
TABLET ORAL
Qty: 90 TAB | Refills: 0 | Status: SHIPPED | OUTPATIENT
Start: 2019-07-13 | End: 2019-10-16 | Stop reason: SDUPTHER

## 2019-09-04 ENCOUNTER — DOCUMENTATION ONLY (OUTPATIENT)
Dept: FAMILY MEDICINE CLINIC | Age: 84
End: 2019-09-04

## 2019-10-10 NOTE — PROGRESS NOTES
1. Have you been to the ER, urgent care clinic since your last visit? Hospitalized since your last visit? No    2. Have you seen or consulted any other health care providers outside of the [address] [place] [address] since your last visit? Include any pap smears or colon screening.  No Spiritual Care Assessment/Progress Note  [facility]      NAME: Nito Blevins      MRN: [id number]  AGE: 62 y.o.  SEX: female  Yarsani Affiliation: Non Muslim   Language: English     10/10/2019     Total Time (in minutes): 14     Spiritual Assessment begun in [facility] 3 MED TELE through conversation with:         [x]Patient        [] Family    [] Friend(s)        Reason for Consult: Initial/Spiritual assessment, patient floor     Spiritual beliefs: (Please include comment if needed)     [x] Identifies with a zay tradition:         [x] Supported by a zay community:   [address]         [] Claims no spiritual orientation:           [] Seeking spiritual identity:                [] Adheres to an individual form of spirituality:           [] Not able to assess:                           Identified resources for coping:      [x] Prayer                               [] Music                  [] Guided Imagery     [x] Family/friends                 [] Pet visits     [] Devotional reading                         [] Unknown     [] Other:                                             Interventions offered during this visit: (See comments for more details)    Patient Interventions: Affirmation of zay, Affirmation of emotions/emotional suffering, Catharsis/review of pertinent events in supportive environment, Iconic (affirming the presence of God/Higher Power), Normalization of emotional/spiritual concerns, Prayer (assurance of), Yarsani beliefs/image of God discussed, Coping skills reviewed/reinforced           Plan of Care:     [x] Support spiritual and/or cultural needs    [] Support AMD and/or advance care planning process      [] Support grieving process   [] Coordinate Rites and/or Rituals    [] Coordination with community clergy   [] No spiritual needs identified at this time   [] Detailed Plan of Care below (See Comments)  [] Make referral to Music Therapy  [] Make referral to Pet Therapy     [] Make referral to Addiction services  [] Make referral to [facility]  [] Make referral to Spiritual Care Partner  [] No future visits requested        [x] Follow up visits as needed     Comments:   Initial visit on Med Tele for spiritual assessment. No family/friends present. Provided pastoral presence and supportive listening as patient shared about her current medical challenges. She shared she is beginning to feel better. Ms. Lian Rome has supportive family as well as [employer] friends who visit each time she is hospitalized. She belongs to [address] [place] [address], family and friends are strong resources for coping. She was receptive to assurance of prayer. Advised patient of  availability.     ROGE Bowling, [facility], [address]    [address] Paging Service  [phone]

## 2019-10-16 RX ORDER — LEVOTHYROXINE SODIUM 25 UG/1
TABLET ORAL
Qty: 90 TAB | Refills: 0 | Status: SHIPPED | OUTPATIENT
Start: 2019-10-16 | End: 2020-01-08

## 2019-12-23 ENCOUNTER — OFFICE VISIT (OUTPATIENT)
Dept: FAMILY MEDICINE CLINIC | Age: 84
End: 2019-12-23

## 2019-12-23 VITALS
HEART RATE: 71 BPM | HEIGHT: 60 IN | WEIGHT: 115.2 LBS | OXYGEN SATURATION: 97 % | TEMPERATURE: 98.6 F | SYSTOLIC BLOOD PRESSURE: 145 MMHG | BODY MASS INDEX: 22.62 KG/M2 | RESPIRATION RATE: 14 BRPM | DIASTOLIC BLOOD PRESSURE: 55 MMHG

## 2019-12-23 DIAGNOSIS — M25.572 CHRONIC PAIN OF LEFT ANKLE: Primary | ICD-10-CM

## 2019-12-23 DIAGNOSIS — G89.29 CHRONIC PAIN OF LEFT ANKLE: Primary | ICD-10-CM

## 2019-12-23 RX ORDER — DICLOFENAC SODIUM 10 MG/G
GEL TOPICAL
COMMUNITY
Start: 2019-11-22 | End: 2021-01-12

## 2019-12-23 RX ORDER — METOPROLOL SUCCINATE 25 MG/1
TABLET, EXTENDED RELEASE ORAL 2 TIMES DAILY
COMMUNITY
Start: 2019-12-16

## 2019-12-23 RX ORDER — CIPROFLOXACIN 250 MG/1
TABLET, FILM COATED ORAL
COMMUNITY
Start: 2019-12-23 | End: 2020-04-27

## 2019-12-23 NOTE — PROGRESS NOTES
Assessment and Plan    1. Chronic pain of left ankle  Possible chondral fragments, check CT  Referral to Dr. Alejandra Wayne  - CT LOW EXT LT W CONT; Future  - REFERRAL TO ORTHOPEDICS      Follow-up and Dispositions    · Return in about 1 month (around 1/23/2020) for Review test results. Diagnosis and plan discussed with patient who verbillized understanding. History of present Alex Fostre is a 80 y.o. female presenting for Ankle Pain (x 1 years has gotten worst )    Left ankle  Worse over past year. Limp to cane to walker. No recent injury. Casted in past for prior injury. Saw Dr. Lisa José. Told had arthritis  Severe DJD by xray per Dr. Lisa José notes  Sometimes will be hurting badly then suddenly better while walking. Had atrial fibrillation and UTI last week    Review of Systems   Musculoskeletal: Positive for joint pain.          Past Medical History:   Diagnosis Date    Anxiety     Atrial fibrillation ([address]     Dysphagia     Insomnia     contract signed 11/2017    Macular degeneration     Osteoarthritis     Pancreatitis 2010    Pneumonia 2016    [facility]     SIADH (syndrome of inappropriate ADH production) ([facility])     Vertigo      Past Surgical History:   Procedure Laterality Date    HX CHOLECYSTECTOMY      HX GYN       Family History   Problem Relation Age of Onset    Heart Disease Mother     Stroke Father     Cancer Sister      Social History     Socioeconomic History    Marital status:      Spouse name: Not on file    Number of children: Not on file    Years of education: Not on file    Highest education level: Not on file   Occupational History    Not on file   Social Needs    Financial resource strain: Not on file    Food insecurity:     Worry: Not on file     Inability: Not on file    Transportation needs:     Medical: Not on file     Non-medical: Not on file   Tobacco Use    Smoking status: Never Smoker    Smokeless tobacco: Never Used   Substance and Sexual Activity    Alcohol use: Yes     Alcohol/week: 1.0 standard drinks     Types: 1 Glasses of wine per week    Drug use: No    Sexual activity: Not Currently   Lifestyle    Physical activity:     Days per week: Not on file     Minutes per session: Not on file    Stress: Not on file   Relationships    Social connections:     Talks on phone: Not on file     Gets together: Not on file     Attends Jehovah's witness service: Not on file     Active member of club or organization: Not on file     Attends meetings of clubs or organizations: Not on file     Relationship status: Not on file    Intimate partner violence:     Fear of current or ex partner: Not on file     Emotionally abused: Not on file     Physically abused: Not on file     Forced sexual activity: Not on file   Other Topics Concern    Not on file   Social History Narrative    Not on file         Prior to Admission medications    Medication Sig Start Date End Date Taking? Authorizing Provider   levothyroxine (SYNTHROID) 25 mcg tablet TAKE 1 TABLET BY MOUTH DAILY BEFORE BREAKFAST 10/16/19  Yes Loulou Braden MD   ELIQUIS 2.5 mg tablet TK UTD PO  BID 5/20/19  Yes Provider, Historical   vit A/vit C/vit E/zinc/copper (PRESERVISION AREDS PO) Take  by mouth. Yes Provider, Historical   MULTIVITAMIN PO Take  by mouth. Yes Provider, Historical   famotidine (PEPCID) 20 mg tablet 10 mg. Yes Provider, Historical   acetaminophen (TYLENOL) 325 mg tablet 650 mg. Yes Provider, Historical   amLODIPine (NORVASC) 10 mg tablet Take 10 mg by mouth once.  8/18/18  Yes Provider, Historical   ciprofloxacin HCl (CIPRO) 250 mg tablet  12/23/19   Provider, Historical   diclofenac (VOLTAREN) 1 % gel CHERYL 2 GRAMS EXT AA Q 8 H FOR 21 DAYS 11/22/19   Provider, Historical   metoprolol succinate (TOPROL-XL) 25 mg XL tablet TK 1 T PO ONCE A DAY 12/16/19   Provider, Historical   peg 400-propylene glycol (SYSTANE, PROPYLENE GLYCOL,) 0.4-0.3 % drop 1 Drop two (2) times a day. Provider, Historical        Allergies   Allergen Reactions    Ace Inhibitors Unknown (comments), Hives and Rash     None noted  None noted  Edema    Lisinopril Other (comments)     Edema         Vitals:    12/23/19 1632   BP: 145/55   Pulse: 71   Resp: 14   Temp: 98.6 °F (37 °C)   TempSrc: Oral   SpO2: 97%   Weight: 115 lb 3.2 oz (52.3 kg)   Height: 5' (1.524 m)     Body mass index is 22.5 kg/m². Objective  Physical Exam  General: Patient alert and oriented and in NAD  Musculoskeletal: Left ankle, relatively normal external appearance. Limited ROM in dorsiflexion. Nontender, normal motor function and pulses. Skin: No rashes or lesions noted on exposed skin  Neuro: AAOx3, normal gait and speech. No gross neurologic deficits. Psych: Appropriate mood and affect, no homicidal or suicidal ideation, no obsessions, delusions or hallucinations, normal psychomotor status.

## 2019-12-23 NOTE — PROGRESS NOTES
Identified pt with two pt identifiers(name and ). Reviewed record in preparation for visit and have obtained necessary documentation. Chief Complaint   Patient presents with    Ankle Pain     x 1 years has gotten worst         Health Maintenance Due   Topic    DTaP/Tdap/Td series (1 - Tdap)    GLAUCOMA SCREENING Q2Y     Pneumococcal 65+ years (1 of 1 - PPSV23)    Shingrix Vaccine Age 50> (2 of 2)       Coordination of Care Questionnaire:  :   1) Have you been to an emergency room, urgent care, or hospitalized since your last visit? If yes, where when, and reason for visit? No       2. Have seen or consulted any other health care provider since your last visit? If yes, where when, and reason for visit?   Heart and urology

## 2020-01-02 ENCOUNTER — HOSPITAL ENCOUNTER (OUTPATIENT)
Dept: CT IMAGING | Age: 85
Discharge: HOME | End: 2020-01-02
Attending: FAMILY MEDICINE
Payer: MEDICARE

## 2020-01-02 DIAGNOSIS — M25.572 CHRONIC PAIN OF LEFT ANKLE: ICD-10-CM

## 2020-01-02 DIAGNOSIS — G89.29 CHRONIC PAIN OF LEFT ANKLE: ICD-10-CM

## 2020-01-02 PROCEDURE — 73700 CT LOWER EXTREMITY W/O DYE: CPT

## 2020-01-13 ENCOUNTER — TELEPHONE (OUTPATIENT)
Dept: FAMILY MEDICINE CLINIC | Age: 85
End: 2020-01-13

## 2020-01-13 NOTE — TELEPHONE ENCOUNTER
Pts daughter is requesting tramadol for pt , pt is still in pain, she has a apt to get her foot injected the last injection did not help. They went to specialist to get a second opinion and they do not prescribe pain med.  Let me know I can call pt back  [phone]

## 2020-01-14 DIAGNOSIS — M25.572 CHRONIC PAIN OF LEFT ANKLE: Primary | ICD-10-CM

## 2020-01-14 DIAGNOSIS — G89.29 CHRONIC PAIN OF LEFT ANKLE: Primary | ICD-10-CM

## 2020-01-14 RX ORDER — TRAMADOL HYDROCHLORIDE 50 MG/1
50 TABLET ORAL
Qty: 25 TAB | Refills: 0 | Status: SHIPPED | OUTPATIENT
Start: 2020-01-14 | End: 2020-01-17

## 2020-01-14 NOTE — TELEPHONE ENCOUNTER
Call patient. I have sent in Tramadol for her ankle pain to last several weeks but she will need to get future pain prescriptions from the specialist taking care of that problem.   Only other option is going to a chronic pain specialist.  [facility]

## 2020-04-27 ENCOUNTER — HOSPITAL ENCOUNTER (OUTPATIENT)
Age: 85
Setting detail: OBSERVATION
Discharge: HOME | End: 2020-04-28
Attending: STUDENT IN AN ORGANIZED HEALTH CARE EDUCATION/TRAINING PROGRAM | Admitting: FAMILY MEDICINE
Payer: MEDICARE

## 2020-04-27 ENCOUNTER — APPOINTMENT (OUTPATIENT)
Dept: CT IMAGING | Age: 85
End: 2020-04-27
Attending: FAMILY MEDICINE
Payer: MEDICARE

## 2020-04-27 ENCOUNTER — TELEPHONE (OUTPATIENT)
Dept: FAMILY MEDICINE CLINIC | Age: 85
End: 2020-04-27

## 2020-04-27 DIAGNOSIS — R19.7 DIARRHEA OF PRESUMED INFECTIOUS ORIGIN: ICD-10-CM

## 2020-04-27 DIAGNOSIS — I48.91 ATRIAL FIBRILLATION WITH RVR (HCC): Primary | ICD-10-CM

## 2020-04-27 DIAGNOSIS — N30.01 ACUTE CYSTITIS WITH HEMATURIA: ICD-10-CM

## 2020-04-27 LAB
ALBUMIN SERPL-MCNC: 3.7 G/DL (ref 3.5–5)
ALBUMIN/GLOB SERPL: 1 {RATIO} (ref 1.1–2.2)
ALP SERPL-CCNC: 81 U/L (ref 45–117)
ALT SERPL-CCNC: 35 U/L (ref 12–78)
AMORPH CRY URNS QL MICRO: ABNORMAL
ANION GAP SERPL CALC-SCNC: 12 MMOL/L (ref 5–15)
APPEARANCE UR: ABNORMAL
AST SERPL-CCNC: 25 U/L (ref 15–37)
ATRIAL RATE: 256 BPM
BACTERIA URNS QL MICRO: ABNORMAL /HPF
BASOPHILS # BLD: 0 K/UL (ref 0–0.1)
BASOPHILS NFR BLD: 0 % (ref 0–1)
BILIRUB SERPL-MCNC: 0.7 MG/DL (ref 0.2–1)
BILIRUB UR QL: NEGATIVE
BNP SERPL-MCNC: 2879 PG/ML (ref 0–450)
BUN SERPL-MCNC: 19 MG/DL (ref 6–20)
BUN/CREAT SERPL: 21 (ref 12–20)
CALCIUM SERPL-MCNC: 8.5 MG/DL (ref 8.5–10.1)
CALCULATED P AXIS, ECG09: -92 DEGREES
CALCULATED R AXIS, ECG10: -34 DEGREES
CALCULATED T AXIS, ECG11: -72 DEGREES
CHLORIDE SERPL-SCNC: 100 MMOL/L (ref 97–108)
CO2 SERPL-SCNC: 26 MMOL/L (ref 21–32)
COLOR UR: ABNORMAL
COMMENT, HOLDF: NORMAL
CREAT SERPL-MCNC: 0.89 MG/DL (ref 0.55–1.02)
DIAGNOSIS, 93000: NORMAL
DIFFERENTIAL METHOD BLD: ABNORMAL
EOSINOPHIL # BLD: 0.1 K/UL (ref 0–0.4)
EOSINOPHIL NFR BLD: 1 % (ref 0–7)
EPITH CASTS URNS QL MICRO: ABNORMAL /LPF
ERYTHROCYTE [DISTWIDTH] IN BLOOD BY AUTOMATED COUNT: 13.1 % (ref 11.5–14.5)
GLOBULIN SER CALC-MCNC: 3.6 G/DL (ref 2–4)
GLUCOSE SERPL-MCNC: 100 MG/DL (ref 65–100)
GLUCOSE UR STRIP.AUTO-MCNC: NEGATIVE MG/DL
HCT VFR BLD AUTO: 39.3 % (ref 35–47)
HGB BLD-MCNC: 12.9 G/DL (ref 11.5–16)
HGB UR QL STRIP: ABNORMAL
IMM GRANULOCYTES # BLD AUTO: 0 K/UL (ref 0–0.04)
IMM GRANULOCYTES NFR BLD AUTO: 0 % (ref 0–0.5)
KETONES UR QL STRIP.AUTO: NEGATIVE MG/DL
LEUKOCYTE ESTERASE UR QL STRIP.AUTO: ABNORMAL
LIPASE SERPL-CCNC: 75 U/L (ref 73–393)
LYMPHOCYTES # BLD: 1.6 K/UL (ref 0.8–3.5)
LYMPHOCYTES NFR BLD: 15 % (ref 12–49)
MAGNESIUM SERPL-MCNC: 1.4 MG/DL (ref 1.6–2.4)
MCH RBC QN AUTO: 30.6 PG (ref 26–34)
MCHC RBC AUTO-ENTMCNC: 32.8 G/DL (ref 30–36.5)
MCV RBC AUTO: 93.1 FL (ref 80–99)
MONOCYTES # BLD: 1 K/UL (ref 0–1)
MONOCYTES NFR BLD: 9 % (ref 5–13)
NEUTS SEG # BLD: 8.2 K/UL (ref 1.8–8)
NEUTS SEG NFR BLD: 75 % (ref 32–75)
NITRITE UR QL STRIP.AUTO: NEGATIVE
NRBC # BLD: 0 K/UL (ref 0–0.01)
NRBC BLD-RTO: 0 PER 100 WBC
OTHER,OTHU: ABNORMAL
PH UR STRIP: 6 [PH] (ref 5–8)
PLATELET # BLD AUTO: 264 K/UL (ref 150–400)
PMV BLD AUTO: 9.3 FL (ref 8.9–12.9)
POTASSIUM SERPL-SCNC: 3.3 MMOL/L (ref 3.5–5.1)
PROT SERPL-MCNC: 7.3 G/DL (ref 6.4–8.2)
PROT UR STRIP-MCNC: NEGATIVE MG/DL
Q-T INTERVAL, ECG07: 354 MS
QRS DURATION, ECG06: 70 MS
QTC CALCULATION (BEZET), ECG08: 497 MS
RBC # BLD AUTO: 4.22 M/UL (ref 3.8–5.2)
RBC #/AREA URNS HPF: ABNORMAL /HPF (ref 0–5)
SAMPLES BEING HELD,HOLD: NORMAL
SODIUM SERPL-SCNC: 138 MMOL/L (ref 136–145)
SP GR UR REFRACTOMETRY: 1 (ref 1–1.03)
TROPONIN I SERPL-MCNC: <0.05 NG/ML
TROPONIN I SERPL-MCNC: <0.05 NG/ML
UR CULT HOLD, URHOLD: NORMAL
UROBILINOGEN UR QL STRIP.AUTO: 0.2 EU/DL (ref 0.2–1)
VENTRICULAR RATE, ECG03: 119 BPM
WBC # BLD AUTO: 10.9 K/UL (ref 3.6–11)
WBC URNS QL MICRO: >100 /HPF (ref 0–4)

## 2020-04-27 PROCEDURE — 99285 EMERGENCY DEPT VISIT HI MDM: CPT

## 2020-04-27 PROCEDURE — 36415 COLL VENOUS BLD VENIPUNCTURE: CPT

## 2020-04-27 PROCEDURE — 96361 HYDRATE IV INFUSION ADD-ON: CPT

## 2020-04-27 PROCEDURE — 84484 ASSAY OF TROPONIN QUANT: CPT

## 2020-04-27 PROCEDURE — 99218 HC RM OBSERVATION: CPT

## 2020-04-27 PROCEDURE — 74011250637 HC RX REV CODE- 250/637: Performed by: FAMILY MEDICINE

## 2020-04-27 PROCEDURE — 93005 ELECTROCARDIOGRAM TRACING: CPT

## 2020-04-27 PROCEDURE — 74011250636 HC RX REV CODE- 250/636: Performed by: FAMILY MEDICINE

## 2020-04-27 PROCEDURE — 81001 URINALYSIS AUTO W/SCOPE: CPT

## 2020-04-27 PROCEDURE — 85025 COMPLETE CBC W/AUTO DIFF WBC: CPT

## 2020-04-27 PROCEDURE — 74011000258 HC RX REV CODE- 258: Performed by: STUDENT IN AN ORGANIZED HEALTH CARE EDUCATION/TRAINING PROGRAM

## 2020-04-27 PROCEDURE — 80053 COMPREHEN METABOLIC PANEL: CPT

## 2020-04-27 PROCEDURE — 83690 ASSAY OF LIPASE: CPT

## 2020-04-27 PROCEDURE — 83735 ASSAY OF MAGNESIUM: CPT

## 2020-04-27 PROCEDURE — 87086 URINE CULTURE/COLONY COUNT: CPT

## 2020-04-27 PROCEDURE — 74176 CT ABD & PELVIS W/O CONTRAST: CPT

## 2020-04-27 PROCEDURE — 96374 THER/PROPH/DIAG INJ IV PUSH: CPT

## 2020-04-27 PROCEDURE — 83880 ASSAY OF NATRIURETIC PEPTIDE: CPT

## 2020-04-27 PROCEDURE — 96375 TX/PRO/DX INJ NEW DRUG ADDON: CPT

## 2020-04-27 PROCEDURE — 74011250636 HC RX REV CODE- 250/636: Performed by: STUDENT IN AN ORGANIZED HEALTH CARE EDUCATION/TRAINING PROGRAM

## 2020-04-27 RX ORDER — ONDANSETRON 2 MG/ML
4 INJECTION INTRAMUSCULAR; INTRAVENOUS
Status: COMPLETED | OUTPATIENT
Start: 2020-04-27 | End: 2020-04-27

## 2020-04-27 RX ORDER — SODIUM CHLORIDE 0.9 % (FLUSH) 0.9 %
5-40 SYRINGE (ML) INJECTION AS NEEDED
Status: DISCONTINUED | OUTPATIENT
Start: 2020-04-27 | End: 2020-04-28 | Stop reason: HOSPADM

## 2020-04-27 RX ORDER — AMLODIPINE BESYLATE 5 MG/1
10 TABLET ORAL ONCE
Status: DISCONTINUED | OUTPATIENT
Start: 2020-04-27 | End: 2020-04-27

## 2020-04-27 RX ORDER — SODIUM CHLORIDE 0.9 % (FLUSH) 0.9 %
5-40 SYRINGE (ML) INJECTION EVERY 8 HOURS
Status: DISCONTINUED | OUTPATIENT
Start: 2020-04-27 | End: 2020-04-28 | Stop reason: HOSPADM

## 2020-04-27 RX ORDER — DICYCLOMINE HYDROCHLORIDE 20 MG/1
20 TABLET ORAL
Status: DISCONTINUED | OUTPATIENT
Start: 2020-04-27 | End: 2020-04-28 | Stop reason: HOSPADM

## 2020-04-27 RX ORDER — ACETAMINOPHEN 325 MG/1
650 TABLET ORAL
Status: DISCONTINUED | OUTPATIENT
Start: 2020-04-27 | End: 2020-04-28 | Stop reason: HOSPADM

## 2020-04-27 RX ORDER — SODIUM CHLORIDE 9 MG/ML
75 INJECTION, SOLUTION INTRAVENOUS CONTINUOUS
Status: DISCONTINUED | OUTPATIENT
Start: 2020-04-27 | End: 2020-04-28

## 2020-04-27 RX ORDER — POTASSIUM CHLORIDE 14.9 MG/ML
10 INJECTION INTRAVENOUS ONCE
Status: COMPLETED | OUTPATIENT
Start: 2020-04-27 | End: 2020-04-27

## 2020-04-27 RX ORDER — METOPROLOL SUCCINATE 25 MG/1
25 TABLET, EXTENDED RELEASE ORAL DAILY
Status: DISCONTINUED | OUTPATIENT
Start: 2020-04-28 | End: 2020-04-28 | Stop reason: HOSPADM

## 2020-04-27 RX ORDER — ONDANSETRON 2 MG/ML
4 INJECTION INTRAMUSCULAR; INTRAVENOUS
Status: DISCONTINUED | OUTPATIENT
Start: 2020-04-27 | End: 2020-04-28 | Stop reason: HOSPADM

## 2020-04-27 RX ORDER — MAGNESIUM SULFATE HEPTAHYDRATE 40 MG/ML
2 INJECTION, SOLUTION INTRAVENOUS ONCE
Status: COMPLETED | OUTPATIENT
Start: 2020-04-27 | End: 2020-04-27

## 2020-04-27 RX ORDER — LEVOTHYROXINE SODIUM 25 UG/1
25 TABLET ORAL
Status: DISCONTINUED | OUTPATIENT
Start: 2020-04-28 | End: 2020-04-28 | Stop reason: HOSPADM

## 2020-04-27 RX ORDER — SODIUM CHLORIDE 9 MG/ML
100 INJECTION, SOLUTION INTRAVENOUS ONCE
Status: COMPLETED | OUTPATIENT
Start: 2020-04-27 | End: 2020-04-27

## 2020-04-27 RX ADMIN — MAGNESIUM SULFATE IN WATER 2 G: 40 INJECTION, SOLUTION INTRAVENOUS at 19:07

## 2020-04-27 RX ADMIN — APIXABAN 2.5 MG: 2.5 TABLET, FILM COATED ORAL at 21:04

## 2020-04-27 RX ADMIN — Medication 10 ML: at 22:00

## 2020-04-27 RX ADMIN — SODIUM CHLORIDE 75 ML/HR: 900 INJECTION, SOLUTION INTRAVENOUS at 18:16

## 2020-04-27 RX ADMIN — SODIUM CHLORIDE 100 ML/HR: 900 INJECTION, SOLUTION INTRAVENOUS at 13:57

## 2020-04-27 RX ADMIN — SODIUM CHLORIDE 500 ML: 900 INJECTION, SOLUTION INTRAVENOUS at 18:21

## 2020-04-27 RX ADMIN — CEFTRIAXONE 1 G: 1 INJECTION, POWDER, FOR SOLUTION INTRAMUSCULAR; INTRAVENOUS at 13:56

## 2020-04-27 RX ADMIN — POTASSIUM CHLORIDE 10 MEQ: 200 INJECTION, SOLUTION INTRAVENOUS at 19:07

## 2020-04-27 RX ADMIN — SODIUM CHLORIDE 1000 ML: 900 INJECTION, SOLUTION INTRAVENOUS at 12:00

## 2020-04-27 RX ADMIN — ONDANSETRON 4 MG: 2 INJECTION, SOLUTION INTRAMUSCULAR; INTRAVENOUS at 13:57

## 2020-04-27 NOTE — ED TRIAGE NOTES
Pt arrives to ED for diarrhea since Friday. Pt also had vomiting on Saturday but that resolved with zofran. Pt denies fever.

## 2020-04-27 NOTE — TELEPHONE ENCOUNTER
Pts Daughter, Cristo Marion, called stating pt has been having bouts of diarrhea off and on since Friday, concerned she may need fluids. Daughter satted no fever.

## 2020-04-27 NOTE — PROGRESS NOTES
Patient arrived on floor, paged Dr. Rosa Lambert for admission. Settled in room, Dr. Rosa Lambert arrived on floor, orders placed. 2000- Bedside shift change report given to Via En Stinson RN and Lesly Atkins RN (oncoming nurse) by Greg Urena RN (offgoing nurse). Report included the following information SBAR, Kardex, ED Summary, Intake/Output, MAR, Accordion, Recent Results, Med Rec Status and Cardiac Rhythm Afib.

## 2020-04-27 NOTE — ROUTINE PROCESS
TRANSFER - OUT REPORT: 
 
Verbal report given to [facility]) on Krista Gomez  being transferred to 352(unit) for routine progression of care Report consisted of patients Situation, Background, Assessment and  
Recommendations(SBAR). Information from the following report(s) SBAR and ED Summary was reviewed with the receiving nurse. Lines:  
Peripheral IV 04/27/20 Left Antecubital (Active) Site Assessment Clean, dry, & intact 4/27/2020 11:52 AM  
Phlebitis Assessment 0 4/27/2020 11:52 AM  
Infiltration Assessment 0 4/27/2020 11:52 AM  
Dressing Status Clean, dry, & intact 4/27/2020 11:52 AM  
Hub Color/Line Status Pink 4/27/2020 11:52 AM  
  
 
Opportunity for questions and clarification was provided.

## 2020-04-27 NOTE — ED PROVIDER NOTES
Patient is a 80-year-old female presenting to the emergency department for diarrhea. Patient states that she started having symptoms approximately 2 weeks ago that resolved and returned on Friday. She describes 4-5 loose stool watery diarrhea episodes per day. Patient also started with some nausea on Saturday that resolved with Zofran. Patient denies fevers, chills, body aches, cough, congestion. Patient denies any dizziness or lightheadedness. Patient denies any sick contacts that she lives in an apartment by herself and her daughter and granddaughter have brought her groceries and left on the doorstep for her to . Past Medical History:   Diagnosis Date    Anxiety     [facility]     Dysphagia     Insomnia     contract signed 11/2017    Macular degeneration     Osteoarthritis     Pancreatitis 2010    Pneumonia 2016    PVD (peripheral vascular disease) ([facility]     SIADH (syndrome of inappropriate ADH production) [facility]     Vertigo        Past Surgical History:   Procedure Laterality Date    HX CHOLECYSTECTOMY      HX GYN           Family History:   Problem Relation Age of Onset    Heart Disease Mother     Stroke Father     Cancer Sister        Social History     Socioeconomic History    Marital status:      Spouse name: Not on file    Number of children: Not on file    Years of education: Not on file    Highest education level: Not on file   Occupational History    Not on file   Social Needs    Financial resource strain: Not on file    Food insecurity     Worry: Not on file     Inability: Not on file    Transportation needs     Medical: Not on file     Non-medical: Not on file   Tobacco Use    Smoking status: Never Smoker    Smokeless tobacco: Never Used   Substance and Sexual Activity    Alcohol use:  Yes     Alcohol/week: 1.0 standard drinks     Types: 1 Glasses of wine per week    Drug use: No    Sexual activity: Not Currently   Lifestyle    Physical activity     Days per week: Not on file     Minutes per session: Not on file    Stress: Not on file   Relationships    Social connections     Talks on phone: Not on file     Gets together: Not on file     Attends Yazidi service: Not on file     Active member of club or organization: Not on file     Attends meetings of clubs or organizations: Not on file     Relationship status: Not on file    Intimate partner violence     Fear of current or ex partner: Not on file     Emotionally abused: Not on file     Physically abused: Not on file     Forced sexual activity: Not on file   Other Topics Concern    Not on file   Social History Narrative    Not on file         ALLERGIES: Ace inhibitors and Lisinopril    Review of Systems   Constitutional: Negative for chills, diaphoresis, fatigue and fever. Respiratory: Negative for chest tightness and shortness of breath. Cardiovascular: Negative for chest pain. Gastrointestinal: Positive for diarrhea, nausea and vomiting. Genitourinary: Negative for frequency and urgency. Neurological: Negative for dizziness, weakness, numbness and headaches. All other systems reviewed and are negative. Vitals:    04/27/20 1140   BP: 101/87   Pulse: 95   Resp: 18   Temp: 97.1 °F (36.2 °C)   SpO2: 100%   Weight: 51 kg (112 lb 7 oz)            Physical Exam  Vitals signs and nursing note reviewed. Constitutional:       Appearance: Normal appearance. HENT:      Head: Normocephalic and atraumatic. Nose: Nose normal.   Eyes:      Extraocular Movements: Extraocular movements intact. Conjunctiva/sclera: Conjunctivae normal.      Pupils: Pupils are equal, round, and reactive to light. Cardiovascular:      Rate and Rhythm: Normal rate. Rhythm irregular. Abdominal:      General: There is no distension. Palpations: Abdomen is soft. There is no mass. Tenderness: There is no abdominal tenderness. There is no guarding or rebound.       Hernia: No hernia is present. Musculoskeletal: Normal range of motion. Skin:     General: Skin is warm and dry. Neurological:      General: No focal deficit present. Mental Status: She is alert and oriented to person, place, and time. Psychiatric:         Mood and Affect: Mood normal.         Behavior: Behavior normal.          MDM  Number of Diagnoses or Management Options  Acute cystitis with hematuria:   Atrial fibrillation with RVR ([address]):   Diarrhea of presumed infectious origin:   Diagnosis management comments: A/P: UTI, A. fib with RVR, dehydration. 80-year-old female presents emergency department for diarrhea during reassessment patient noted to be in A. fib with RVR confirmed on EKG. UA also consistent with UTI. Discussed with patient admission due to age and risk factors would prefer to gently rehydrate start on antibiotics patient is hesitant but agreeable with admission. CBC, CMP, lipase, UA, urine culture, EKG.  1 L normal saline, Zofran 4 mg IV, Rocephin 1 g. Amount and/or Complexity of Data Reviewed  Clinical lab tests: ordered and reviewed  Review and summarize past medical records: yes  Discuss the patient with other providers: yes  Independent visualization of images, tracings, or specimens: yes    Risk of Complications, Morbidity, and/or Mortality  Presenting problems: moderate  Diagnostic procedures: moderate  Management options: moderate    Patient Progress  Patient progress: stable         Procedures      1:06 PM  Went to reassess patient noticed patient's heart rate in the 120s to 130s. Patient with a history of A. fib will obtain EKG. Arcenioist Aniceto Serve for Admission  1:34 PM    ED Room Number: SER02/02  Patient Name and age:  Winnie Spurling 80 y.o.  female  Working Diagnosis:   1. Atrial fibrillation with RVR ([facility] [address]    2. Diarrhea of presumed infectious origin    3.  Acute cystitis with hematuria        COVID-19 Suspicion:  no    Readmission: no  Isolation Requirements: no  Recommended Level of Care:  Telemetry  Department:[facility] Adult ED - [phone]  Other:  81 y/o female with diarrhea x 2 weeks with assoc N/V now, no fevers, no sick contacts, lives in apt. By herself, no concern for COVID. Also with afib now with RVR. ED EKG interpretation:  Rhythm: atrial flutter; and irregular. Rate (approx.): 119; Axis: left axis deviation; ; QRS interval: normal ; ST/T wave: non-specific changes; in  Lead: II ; Other findings: abnormal ekg.    EKG documented by Himanshu Watts MD,

## 2020-04-28 VITALS
DIASTOLIC BLOOD PRESSURE: 68 MMHG | WEIGHT: 121.91 LBS | OXYGEN SATURATION: 90 % | HEIGHT: 60 IN | RESPIRATION RATE: 18 BRPM | TEMPERATURE: 99.5 F | HEART RATE: 92 BPM | BODY MASS INDEX: 23.94 KG/M2 | SYSTOLIC BLOOD PRESSURE: 112 MMHG

## 2020-04-28 LAB
ANION GAP SERPL CALC-SCNC: 7 MMOL/L (ref 5–15)
BACTERIA SPEC CULT: NORMAL
BASOPHILS # BLD: 0 K/UL (ref 0–0.1)
BASOPHILS NFR BLD: 0 % (ref 0–1)
BUN SERPL-MCNC: 10 MG/DL (ref 6–20)
BUN/CREAT SERPL: 22 (ref 12–20)
CALCIUM SERPL-MCNC: 7.6 MG/DL (ref 8.5–10.1)
CC UR VC: NORMAL
CHLORIDE SERPL-SCNC: 108 MMOL/L (ref 97–108)
CO2 SERPL-SCNC: 21 MMOL/L (ref 21–32)
CREAT SERPL-MCNC: 0.46 MG/DL (ref 0.55–1.02)
DIFFERENTIAL METHOD BLD: ABNORMAL
EOSINOPHIL # BLD: 0.2 K/UL (ref 0–0.4)
EOSINOPHIL NFR BLD: 2 % (ref 0–7)
ERYTHROCYTE [DISTWIDTH] IN BLOOD BY AUTOMATED COUNT: 13.2 % (ref 11.5–14.5)
GLUCOSE SERPL-MCNC: 96 MG/DL (ref 65–100)
HCT VFR BLD AUTO: 31.7 % (ref 35–47)
HGB BLD-MCNC: 10.5 G/DL (ref 11.5–16)
IMM GRANULOCYTES # BLD AUTO: 0 K/UL (ref 0–0.04)
IMM GRANULOCYTES NFR BLD AUTO: 0 % (ref 0–0.5)
LYMPHOCYTES # BLD: 1.9 K/UL (ref 0.8–3.5)
LYMPHOCYTES NFR BLD: 22 % (ref 12–49)
MAGNESIUM SERPL-MCNC: 2.2 MG/DL (ref 1.6–2.4)
MCH RBC QN AUTO: 30.8 PG (ref 26–34)
MCHC RBC AUTO-ENTMCNC: 33.1 G/DL (ref 30–36.5)
MCV RBC AUTO: 93 FL (ref 80–99)
MONOCYTES # BLD: 1.1 K/UL (ref 0–1)
MONOCYTES NFR BLD: 13 % (ref 5–13)
NEUTS SEG # BLD: 5.5 K/UL (ref 1.8–8)
NEUTS SEG NFR BLD: 63 % (ref 32–75)
NRBC # BLD: 0 K/UL (ref 0–0.01)
NRBC BLD-RTO: 0 PER 100 WBC
PLATELET # BLD AUTO: 234 K/UL (ref 150–400)
PMV BLD AUTO: 9.3 FL (ref 8.9–12.9)
POTASSIUM SERPL-SCNC: 3.2 MMOL/L (ref 3.5–5.1)
RBC # BLD AUTO: 3.41 M/UL (ref 3.8–5.2)
SERVICE CMNT-IMP: NORMAL
SODIUM SERPL-SCNC: 136 MMOL/L (ref 136–145)
TROPONIN I SERPL-MCNC: <0.05 NG/ML
WBC # BLD AUTO: 8.6 K/UL (ref 3.6–11)

## 2020-04-28 PROCEDURE — 96376 TX/PRO/DX INJ SAME DRUG ADON: CPT

## 2020-04-28 PROCEDURE — 36415 COLL VENOUS BLD VENIPUNCTURE: CPT

## 2020-04-28 PROCEDURE — 74011250637 HC RX REV CODE- 250/637: Performed by: PHYSICIAN ASSISTANT

## 2020-04-28 PROCEDURE — 85025 COMPLETE CBC W/AUTO DIFF WBC: CPT

## 2020-04-28 PROCEDURE — 74011250637 HC RX REV CODE- 250/637: Performed by: FAMILY MEDICINE

## 2020-04-28 PROCEDURE — 74011250636 HC RX REV CODE- 250/636: Performed by: FAMILY MEDICINE

## 2020-04-28 PROCEDURE — 80048 BASIC METABOLIC PNL TOTAL CA: CPT

## 2020-04-28 PROCEDURE — 94760 N-INVAS EAR/PLS OXIMETRY 1: CPT

## 2020-04-28 PROCEDURE — 84484 ASSAY OF TROPONIN QUANT: CPT

## 2020-04-28 PROCEDURE — 74011000258 HC RX REV CODE- 258: Performed by: FAMILY MEDICINE

## 2020-04-28 PROCEDURE — 83735 ASSAY OF MAGNESIUM: CPT

## 2020-04-28 PROCEDURE — 99218 HC RM OBSERVATION: CPT

## 2020-04-28 RX ORDER — MAGNESIUM SULFATE HEPTAHYDRATE 40 MG/ML
2 INJECTION, SOLUTION INTRAVENOUS
Status: DISCONTINUED | OUTPATIENT
Start: 2020-04-28 | End: 2020-04-28

## 2020-04-28 RX ORDER — POTASSIUM CHLORIDE 750 MG/1
40 TABLET, FILM COATED, EXTENDED RELEASE ORAL
Status: COMPLETED | OUTPATIENT
Start: 2020-04-28 | End: 2020-04-28

## 2020-04-28 RX ADMIN — CEFTRIAXONE 1 G: 1 INJECTION, POWDER, FOR SOLUTION INTRAMUSCULAR; INTRAVENOUS at 13:46

## 2020-04-28 RX ADMIN — SODIUM CHLORIDE 75 ML/HR: 900 INJECTION, SOLUTION INTRAVENOUS at 01:37

## 2020-04-28 RX ADMIN — Medication 10 ML: at 06:41

## 2020-04-28 RX ADMIN — Medication 1 CAPSULE: at 09:05

## 2020-04-28 RX ADMIN — POTASSIUM CHLORIDE 40 MEQ: 750 TABLET, FILM COATED, EXTENDED RELEASE ORAL at 11:38

## 2020-04-28 RX ADMIN — LEVOTHYROXINE SODIUM 25 MCG: 0.03 TABLET ORAL at 06:41

## 2020-04-28 RX ADMIN — POTASSIUM CHLORIDE 40 MEQ: 750 TABLET, FILM COATED, EXTENDED RELEASE ORAL at 09:05

## 2020-04-28 RX ADMIN — METOPROLOL SUCCINATE 25 MG: 25 TABLET, EXTENDED RELEASE ORAL at 09:05

## 2020-04-28 RX ADMIN — ACETAMINOPHEN 650 MG: 325 TABLET ORAL at 11:38

## 2020-04-28 RX ADMIN — APIXABAN 2.5 MG: 2.5 TABLET, FILM COATED ORAL at 09:05

## 2020-04-28 NOTE — PROGRESS NOTES
Bedside and Verbal shift change report given to [address] [place] [address] Street, RN (oncoming nurse) by Lashonda Chambers RN (offgoing nurse). Report included the following information SBAR, Kardex, Intake/Output, MAR, Recent Results and Cardiac Rhythm Afib.

## 2020-04-28 NOTE — DISCHARGE SUMMARY
Discharge Summary       PATIENT ID: Ruth Joy  MRN: [id number]   YOB: 1923    DATE OF ADMISSION: 4/27/2020 11:33 AM    DATE OF DISCHARGE: 04/28/20  PRIMARY CARE PROVIDER: Gwen Boyer MD       DISCHARGING PROVIDER: Kristen Romo MD    To contact this individual call [phone] and ask the  to page. If unavailable ask to be transferred the Adult Hospitalist Department. CONSULTATIONS: None      PROCEDURES/SURGERIES: * No surgery found *    IMAGING  Ct Abd Pelv Wo Cont    Result Date: 4/27/2020  IMPRESSION: 1. No acute process in the abdomen and pelvis. 2. Pelvic floor relaxation and cystocele. [address] [facility] COURSE:   The patient is a 30-year-old female with past medical history of atrial fibrillation, currently on Eliquis; anxiety; dysphagia; macular degeneration; osteoarthritis; pancreatitis; pneumonia; PVD; SIADH; and vertigo, who presents to the hospital with diarrhea     # Acute diarrhea   # Atrial fibrillation with rapid ventricular response   # Hypokalemia   # Urinary tract infection     Patient had CT abdomen which was unremarkable. She was admitted and started on IVF. Stool studies were ordered but not done as patient had no bowel movement since admission. UA suspected for urinary tract infection. She was started on Ceftriaxone but Urine culture showed mixed urogenital [place], therefore stopped. Her heart rate came down with only her home medication. She was also on anticoagulation for a fib. Patient felt better and asked to be discharged.      DISCHARGE DIAGNOSES / PLAN:    # Acute diarrhea   # Atrial fibrillation with rapid ventricular response   # Hypokalemia       Patient Active Problem List   Diagnosis Code    Other insomnia G47.09    Essential hypertension I10    Acquired hypothyroidism E03.9    Atrial fibrillation (HCC) I48.91               PENDING TEST RESULTS:   At the time of discharge the following test results are still pending: None     FOLLOW UP APPOINTMENTS:    Follow-up Information     Follow up With Specialties Details Why Contact Info    Reginald Rockwell MD [facility] Schedule an appointment as soon as possible for a visit in 2 weeks Follow up  Meg Henriquez  [phone]             ADDITIONAL CARE RECOMMENDATIONS:   · It is important that you take the medication exactly as they are prescribed. · Keep your medication in the bottles provided by the pharmacist and keep a list of the medication names, dosages, and times to be taken in your wallet. · Do not take other medications without consulting your doctor. · No drinking alcohol or driving car or operating machinery if you are on narcotic pain medications. Donot take sedating mediations if you are sleepy or confused. · Fall Precautions  · Keep Well Hydrated  · Report to your medical provider if you feel you have  developed allergies to medications  · Follow up with your PCP or Consultant for medication adjustments and refills  · Monitor for signs of fevers,chills,bleeding,chest pain and seek medical attention if you do so. DIET: GI lite and advance as tolerated     ACTIVITY: As tolerate     WOUND CARE: None     EQUIPMENT needed: None         DISCHARGE MEDICATIONS:  Current Discharge Medication List      CONTINUE these medications which have NOT CHANGED    Details   levothyroxine (SYNTHROID) 25 mcg tablet TAKE 1 TABLET BY MOUTH DAILY BEFORE BREAKFAST  Qty: 90 Tab, Refills: 1      diclofenac (VOLTAREN) 1 % gel CHERYL 2 GRAMS EXT AA Q 8 H FOR 21 DAYS      metoprolol succinate (TOPROL-XL) 25 mg XL tablet TK 1 T PO ONCE A DAY      ELIQUIS 2.5 mg tablet TK UTD PO  BID  Refills: 3      vit A/vit C/vit E/zinc/copper (PRESERVISION AREDS PO) Take  by mouth. MULTIVITAMIN PO Take  by mouth.       famotidine (PEPCID) 20 mg tablet 10 mg.      acetaminophen (TYLENOL) 325 mg tablet 650 mg. amLODIPine (NORVASC) 10 mg tablet Take 10 mg by mouth once. STOP taking these medications       peg 400-propylene glycol (SYSTANE, PROPYLENE GLYCOL,) 0.4-0.3 % drop Comments:   Reason for Stopping:               All Micro Results     Procedure Component Value Units Date/Time    CULTURE, URINE [[id number]] Collected:  04/27/20 1257    Order Status:  Completed Specimen:  Urine from Clean catch Updated:  04/28/20 1537     Special Requests: NO SPECIAL REQUESTS        [place] Count --        [id number]  COLONIES/mL       Culture result:       MIXED UROGENITAL JOSE LUIS ISOLATED          ENTERIC BACTERIA PANEL, DNA [[id number]] Collected:  04/27/20 1830    Order Status:  No result     C. DIFFICILE AG & TOXIN A/B [[id number]]     Order Status:  Sent Specimen:  Stool     OVA & PARASITES, STOOL [[id number]]     Order Status:  Sent Specimen:  Stool from [address] [place] [address] Road [[id number]] Collected:  04/27/20 1257    Order Status:  Completed Specimen:  Urine from Serum Updated:  04/27/20 1300     Urine culture hold       Urine on hold in Microbiology dept for 2 days. If unpreserved urine is submitted, it cannot be used for addtional testing after 24 hours, recollection will be required.                 Recent Results (from the past 24 hour(s))   TROPONIN I    Collection Time: 04/27/20  6:50 PM   Result Value Ref Range    Troponin-I, Qt. <0.05 <5.80 ng/mL   METABOLIC PANEL, BASIC    Collection Time: 04/28/20  5:23 AM   Result Value Ref Range    Sodium 136 136 - 145 mmol/L    Potassium 3.2 (L) 3.5 - 5.1 mmol/L    Chloride 108 97 - 108 mmol/L    CO2 21 21 - 32 mmol/L    Anion gap 7 5 - 15 mmol/L    Glucose 96 65 - 100 mg/dL    BUN 10 6 - 20 MG/DL    Creatinine 0.46 (L) 0.55 - 1.02 MG/DL    BUN/Creatinine ratio 22 (H) 12 - 20      GFR est AA >60 >60 ml/min/1.73m2    GFR est non-AA >60 >60 ml/min/1.73m2    Calcium 7.6 (L) 8.5 - 10.1 MG/DL   CBC WITH AUTOMATED DIFF    Collection Time: 04/28/20  5:23 AM   Result Value Ref Range    WBC 8.6 3.6 - 11.0 K/uL    RBC 3.41 (L) 3.80 - 5.20 M/uL    HGB 10.5 (L) 11.5 - 16.0 g/dL    HCT 31.7 (L) 35.0 - 47.0 %    MCV 93.0 80.0 - 99.0 FL    MCH 30.8 26.0 - 34.0 PG    MCHC 33.1 30.0 - 36.5 g/dL    RDW 13.2 11.5 - 14.5 %    PLATELET 528 515 - 217 K/uL    MPV 9.3 8.9 - 12.9 FL    NRBC 0.0 0  WBC    ABSOLUTE NRBC 0.00 0.00 - 0.01 K/uL    NEUTROPHILS 63 32 - 75 %    LYMPHOCYTES 22 12 - 49 %    MONOCYTES 13 5 - 13 %    EOSINOPHILS 2 0 - 7 %    BASOPHILS 0 0 - 1 %    IMMATURE GRANULOCYTES 0 0.0 - 0.5 %    ABS. NEUTROPHILS 5.5 1.8 - 8.0 K/UL    ABS. LYMPHOCYTES 1.9 0.8 - 3.5 K/UL    ABS. MONOCYTES 1.1 (H) 0.0 - 1.0 K/UL    ABS. EOSINOPHILS 0.2 0.0 - 0.4 K/UL    ABS. BASOPHILS 0.0 0.0 - 0.1 K/UL    ABS. IMM. GRANS. 0.0 0.00 - 0.04 K/UL    DF AUTOMATED     TROPONIN I    Collection Time: 04/28/20  5:23 AM   Result Value Ref Range    Troponin-I, Qt. <0.05 <0.05 ng/mL   MAGNESIUM    Collection Time: 04/28/20  5:23 AM   Result Value Ref Range    Magnesium 2.2 1.6 - 2.4 mg/dL           NOTIFY YOUR PHYSICIAN FOR ANY OF THE FOLLOWING:   Fever over 101 degrees for 24 hours. Chest pain, shortness of breath, fever, chills, nausea, vomiting, diarrhea, change in mentation, falling, weakness, bleeding. Severe pain or pain not relieved by medications. Or, any other signs or symptoms that you may have questions about. DISPOSITION:   x Home With:   OT  PT  HH  RN       Long term SNF/Inpatient Rehab    Independent/assisted living    Hospice    Other:       PATIENT CONDITION AT DISCHARGE:     Functional status    Poor     Deconditioned    x Independent      Cognition     Lucid     Forgetful     Dementia      Catheters/lines (plus indication)    [place]     PICC     PEG    x None      Code status     Full code     DNR      PHYSICAL EXAMINATION AT DISCHARGE:  Gen:  No distress, alert, elderly   HEENT:  Normal cephalic atraumatic, extra-occular movements are intact.   Neck:  Supple, No JVD  Lungs:  Clear bilaterally, no wheeze, no rales, normal effort  Heart:  Regular Rate and Rhythm, normal S1 and S2, no edema  Abdomen:  Soft, non tender, normal bowel sounds, no guarding. Extremities:  Well perfused, no cyanosis or edema  Neurological:  Awake and alert, CN's are intact, normal strength throughout extremities  Skin:  No rashes or moles  Mental Status:  Normal thought process, does not appear anxious      CHRONIC MEDICAL DIAGNOSES:  Problem List as of 4/28/2020 Date Reviewed: 3/4/2019          Codes Class Noted - Resolved    Atrial fibrillation ([facility] ICD-10-CM: I48.91  ICD-9-CM: 427.31  4/27/2020 - Present        Other insomnia ICD-10-CM: G47.09  ICD-9-CM: 780.52  9/4/2018 - Present        Essential hypertension ICD-10-CM: I10  ICD-9-CM: 401.9  9/4/2018 - Present        Acquired hypothyroidism ICD-10-CM: E03.9  ICD-9-CM: 244.9  9/4/2018 - Present                Discussed with patient and family. Explained the importance of following up, Compliance with medications and recommendations on discharge,Side effect profile of medications were explained. Safety precautions at home and while taking pain medications also explained. All questions answered to the satisfaction of the patient/family. Discussed with consultant(s) who are agreeable to the discharge. Verbal and written instructions on discharge given. Explained about Discharge medications and side effect profile. Advised patient/family to followup with their pcp for medication refills and preauthorization of medications, Home health orders. checkups,screenign programs as appropriate for age. Thank you Arjun Biggs MD for taking care of your patient, Please call with any questions.       Greater than 35 minutes were spent with the patient on counseling and coordination of care    Signed:   Ibeth Muñoz MD  4/28/2020  4:50 PM

## 2020-04-28 NOTE — PROGRESS NOTES
Patient's daughter called back, inquiring what time the patient would be discharged. Nursing advised that she will be called once discharge order is in.

## 2020-04-28 NOTE — H&P
[address]  HISTORY AND PHYSICAL    Name:  Yessy Vanegas  MR#:  [id number]  :  1923  ACCOUNT #:  [de-identified]  ADMIT DATE:  2020      CHIEF COMPLAINT:  Diarrhea. HISTORY OF PRESENT ILLNESS:  The patient is a 80-year-old female with past medical history of atrial fibrillation, currently on Eliquis; anxiety; dysphagia; macular degeneration; osteoarthritis; pancreatitis; pneumonia; PVD; SIADH; and vertigo, who presents to the hospital with the above-mentioned symptom. History was primarily obtained from the patient. The patient reports that she has been getting some diarrhea for about 2 weeks. It got better, but then came back 3 days ago. The patient reports that she is having multiple episodes of loose watery stools. Also started having some nausea, took some Zofran and symptoms got better. The patient got concerned and decided to come to the [place] ER today, was found to be in AFib with RVR and was requested to be admitted under the hospitalist service. The patient reports that she has poor appetite, has not been eating or drinking much. Reports that she also had some lightheadedness and dizziness. The patient reports that she has no risk factor for COVID. She lives all by herself. Usually, her granddaughter and daughter leaves food at her doorstep, which she collects. Reports that she has no other complaints or problems. She denies any headache, blurry vision, sore throat, trouble swallowing, trouble with speech. Denies any chest pain, shortness of breath, cough, fever, chills, abdominal pain, urinary symptoms, focal or generalized neurological weakness, recent travel, sick contacts, falls, injuries, hematemesis, melena, hemoptysis, hematuria, or any other concerns or problems. PAST MEDICAL HISTORY:  See above.     HOME MEDICATIONS:  Currently, the patient is on Eliquis 2.5 mg b.i.d., metoprolol 25 mg daily, amlodipine 10 mg daily, multivitamin one tablet daily, Pepcid 20 mg daily, Tylenol as needed. SOCIAL HISTORY:  Denies tobacco abuse. Occasional alcohol. No IV drug abuse. ALLERGIES:  ACE INHIBITOR AND LISINOPRIL. REVIEW OF SYMPTOMS:  All systems were reviewed and were found to be essentially negative except for the symptoms mentioned above. FAMILY HISTORY:  Mother has a history of heart disease and father has a history of stroke. PHYSICAL EXAMINATION:  VITAL SIGNS:  Temperature 98.3, pulse 114, respiratory rate 16, blood pressure 113/61, pulse oximetry 96% on room air. GENERAL:  Alert x3, awake, mildly distressed, pleasant female, appears to be stated age. HEENT:  Pupils are equal and reactive to light. Dry mucous membranes. Tympanic membranes are clear. NECK:  Supple. CHEST:  Decreased basilar breath sounds. CORONARY:  S1, S2 heard. ABDOMEN:  Soft, nontender, nondistended. Bowel sounds are physiological.  EXTREMITIES:  No clubbing, no cyanosis, no edema. NEURO/PSYCH:  Pleasant mood and affect. Cranial nerves II through XII grossly intact. No focal neurological deficits. SKIN:  Warm. LABORATORY DATA:  White count 10.9, hemoglobin 12.9, hematocrit 39.3, platelets 401. Urine shows large amount of leukocyte esterases, many epithelial cells, greater than 100 wbc's, 3+ bacteria. Sodium 138, potassium 3.3, chloride 100, bicarbonate 26, anion gap 12, glucose 100, BUN 19, creatinine 0.89, calcium , magnesium 1.4. Bilirubin total 0.7, ALT 35, AST 25, alkaline phosphatase 81. Lipase 75. Troponin less than 0.05. BNP 2879. EKG shows atrial flutter with variable AV block, left axis deviation, low voltage QRS, nonspecific ST changes. ASSESSMENT AND PLAN:  1. Acute diarrhea:  Unclear etiology. CT of the abdomen and pelvis. Keep the patient on clear-liquid diet, IV hydration, supportive care, hold antibiotics for now as the patient is afebrile. Send stool for studies. The patient denies any recent antibiotic use.   May consider getting a gastroenterology consult and further intervention per hospital course. Reassess as needed. 2.  Atrial fibrillation with rapid ventricular rate:  Likely driven by acute diarrhea and dehydration. Provide gentle IV hydration. Closely monitor. Continue Eliquis. Continue oral metoprolol and further intervention per hospital course. Continue to monitor. The patient's heart rate is bouncing between 100-110. Should improve with hydration. If not, we will get cardiology consult and echocardiogram.  We will get troponin levels and further intervention per hospital course. Continue to monitor. 3.  Hypokalemia: We will replace potassium. 4.  Urinary tract infection: We will start the patient on broad-spectrum antibiotics. We will get urine culture and further intervention per hospital course. Reassess as needed. 5.  Gastrointestinal/deep venous thrombosis prophylaxis:  The patient is on Eliquis.       Della Shah MD      [id number]  D:  04/27/2020 18:23  T:  04/27/2020 22:18  JOB #:  [id number]

## 2020-04-28 NOTE — PROGRESS NOTES
Discharge instructions discussed with patient. Verbalized understanding. Opportunity to ask questions given. IV removed. Signature obtained. Copies given.    (D/c instructions reviewed with MT.)

## 2020-04-28 NOTE — PROGRESS NOTES
Patient reports no diarrhea/bowel movement as of this time and also reported that she is tolerating her diet. Patient is willing to go home and wants her daughter to pick her up at 3 pm. Patient advised that will notify her daughter once discharge order is in place.

## 2020-04-29 ENCOUNTER — PATIENT OUTREACH (OUTPATIENT)
Dept: FAMILY MEDICINE CLINIC | Age: 85
End: 2020-04-29

## 2020-04-29 NOTE — PROGRESS NOTES
Patient contacted regarding recent discharge and COVID-19 risk   Care Transition Nurse/ Ambulatory Care Manager contacted the family by telephone to perform post discharge assessment. Verified name and  with family as identifiers. Spoke with daughter, says mother is better this am. Was very tired last night and went to bed early but sounds better this am. Has been drinking fluids and ate her breakfast. Denies any respiratory symptoms. Mother lives alone and insisted on going back to her apt. Daughter is nearby. Patient has following risk factors of: no known risk factors. CTN/ACM reviewed discharge instructions, medical action plan and red flags related to discharge diagnosis. Reviewed and educated them on any new and changed medications related to discharge diagnosis. Advised obtaining a 90-day supply of all daily and as-needed medications. Education provided regarding infection prevention, and signs and symptoms of COVID-19 and when to seek medical attention with family who verbalized understanding. Discussed exposure protocols and quarantine from [address] you at higher risk for severe illness  and given an opportunity for questions and concerns. The family agrees to contact the COVID-19 hotline [phone] or PCP office for questions related to their healthcare. CTN/ACM provided contact information for future reference. From CDC: Are you at higher risk for severe illness?  Wash your hands often.  Avoid close contact (6 feet, which is about two arm lengths) with people who are sick.  Put distance between yourself and other people if COVID-19 is spreading in your community.  Clean and disinfect frequently touched surfaces.  Avoid all cruise travel and non-essential air travel.  Call your healthcare professional if you have concerns about COVID-19 and your underlying condition or if you are sick.     For more information on steps you can take to protect yourself, see CDC's How to Protect Yourself      Patient/family/caregiver given information for GetWell Loop and agrees to enroll yes  Patient's preferred e-mail:  [email or web address]  Patient's preferred phone number: [phone] (Y)  Based on Loop alert triggers, patient will be contacted by nurse care manager for worsening symptoms. Pt will be further monitored by COVID Loop Team based on severity of symptoms and risk factors.

## 2020-05-04 NOTE — DISCHARGE INSTRUCTIONS
Discharge Instructions       PATIENT ID: Itzel Ramires  MRN: [id number]   YOB: 1923    DATE OF ADMISSION: 4/27/2020 11:33 AM    DATE OF DISCHARGE: 4/28/2020    PRIMARY CARE PROVIDER: Arlette Love MD     ATTENDING PHYSICIAN: Marcelina Francis MD  DISCHARGING PROVIDER: Nina Barraza MD    To contact this individual call [phone] and ask the  to page. If unavailable ask to be transferred the Adult Hospitalist Department. DISCHARGE DIAGNOSES  # Acute diarrhea- Unclear etiology likely viral gastroenteritis     CONSULTATIONS: None    PROCEDURES/SURGERIES: * No surgery found *    PENDING TEST RESULTS:   At the time of discharge the following test results are still pending: None     FOLLOW UP APPOINTMENTS:   Follow-up Information     Follow up With Specialties Details Why Contact Info    Alrette Love MD Family Practice Schedule an appointment as soon as possible for a visit in 2 weeks Follow up  [id number]  [phone]             ADDITIONAL CARE RECOMMENDATIONS: None     DIET: GI lite and advance as tolerated     ACTIVITY: As tolerate     WOUND CARE: None     EQUIPMENT needed: None       DISCHARGE MEDICATIONS:   See Medication Reconciliation Form    · It is important that you take the medication exactly as they are prescribed. · Keep your medication in the bottles provided by the pharmacist and keep a list of the medication names, dosages, and times to be taken in your wallet. · Do not take other medications without consulting your doctor. NOTIFY YOUR PHYSICIAN FOR ANY OF THE FOLLOWING:   Fever over 101 degrees for 24 hours. Chest pain, shortness of breath, fever, chills, nausea, vomiting, diarrhea, change in mentation, falling, weakness, bleeding. Severe pain or pain not relieved by medications. Or, any other signs or symptoms that you may have questions about.       DISPOSITION:  x  Home With:   OT  PT  [facility]  RN SNF/Inpatient Rehab/LTAC    Independent/assisted living    Hospice    Other:     CDMP Checked:   Yes ***     PROBLEM LIST Updated:  Yes ***       Signed:   West Motta MD  4/28/2020  4:45 PM

## 2020-05-11 ENCOUNTER — TELEPHONE (OUTPATIENT)
Dept: FAMILY MEDICINE CLINIC | Age: 85
End: 2020-05-11

## 2020-05-11 ENCOUNTER — DOCUMENTATION ONLY (OUTPATIENT)
Dept: FAMILY MEDICINE CLINIC | Age: 85
End: 2020-05-11

## 2020-05-11 DIAGNOSIS — M19.079 ANKLE ARTHRITIS: Primary | ICD-10-CM

## 2020-05-11 RX ORDER — TRAMADOL HYDROCHLORIDE 50 MG/1
50 TABLET ORAL
Qty: 30 TAB | Refills: 0 | Status: SHIPPED | OUTPATIENT
Start: 2020-05-11 | End: 2020-05-19 | Stop reason: SDUPTHER

## 2020-05-11 NOTE — PROGRESS NOTES
TC from daughter. Tramadol 50 mg , #30   once /day. Severe arthritis of ankle. Lisa out on medical leave, ortho will not prescribe tramadol. 3 injections failed. Pt's daughter will set up a virtual visit with me in next 2 -3 weeks and Lisa will be returning soon and take over Tramadol I expect.  ja

## 2020-05-19 ENCOUNTER — VIRTUAL VISIT (OUTPATIENT)
Dept: FAMILY MEDICINE CLINIC | Age: 85
End: 2020-05-19

## 2020-05-19 DIAGNOSIS — M19.079 ANKLE ARTHRITIS: ICD-10-CM

## 2020-05-19 RX ORDER — TRAMADOL HYDROCHLORIDE 50 MG/1
50 TABLET ORAL
Qty: 45 TAB | Refills: 1 | Status: SHIPPED | OUTPATIENT
Start: 2020-05-19 | End: 2020-05-19 | Stop reason: SDUPTHER

## 2020-05-19 RX ORDER — TRAMADOL HYDROCHLORIDE 50 MG/1
50 TABLET ORAL
Qty: 45 TAB | Refills: 1 | Status: SHIPPED | OUTPATIENT
Start: 2020-05-19 | End: 2020-07-03

## 2020-05-19 NOTE — PROGRESS NOTES
Patient stated name &     Chief Complaint   Patient presents with    Ankle Pain     Arthritis      On going problem        Health Maintenance Due   Topic    DTaP/Tdap/Td series (1 - Tdap)    GLAUCOMA SCREENING Q2Y     Pneumococcal 65+ years (1 of 1 - PPSV23)    Shingrix Vaccine Age 50> (2 of 2)       Wt Readings from Last 3 Encounters:   20 121 lb 14.6 oz (55.3 kg)   19 115 lb 3.2 oz (52.3 kg)   19 112 lb (50.8 kg)     Temp Readings from Last 3 Encounters:   20 99.5 °F (37.5 °C)   19 98.6 °F (37 °C) (Oral)   19 97.4 °F (36.3 °C) (Oral)     BP Readings from Last 3 Encounters:   20 112/68   19 145/55   19 146/75     Pulse Readings from Last 3 Encounters:   20 92   19 71   19 74         Learning Assessment:  :     Learning Assessment 2018   PRIMARY LEARNER Patient   HIGHEST LEVEL OF EDUCATION - PRIMARY LEARNER  4 YEARS OF COLLEGE   PRIMARY LANGUAGE ENGLISH   LEARNER PREFERENCE PRIMARY READING   ANSWERED BY self   RELATIONSHIP SELF       Depression Screening:  :     3 most recent PHQ Screens 2019   Little interest or pleasure in doing things Not at all   Feeling down, depressed, irritable, or hopeless Not at all   Total Score PHQ 2 0       Fall Risk Assessment:  :     Fall Risk Assessment, last 12 mths 2019   Able to walk? Yes   Fall in past 12 months? Yes   Fall with injury? Yes   Number of falls in past 12 months 1   Fall Risk Score 2       Abuse Screening:  :     Abuse Screening Questionnaire 2018   Do you ever feel afraid of your partner? N   Are you in a relationship with someone who physically or mentally threatens you? N       Coordination of Care Questionnaire:  :     1) Have you been to an emergency room, urgent care clinic since your last visit? No  Hospitalized since your last visit?  Yes [facility]   Dehydration the past 30 days             2) Have you seen or consulted any other health care providers outside of [facility] since your last visit? (Include any pap smears or colon screenings in this section.)    Patient is accompanied by VV I have received verbal consent from Bre Dwyer to discuss any/all medical information while they are present in the room.

## 2020-05-19 NOTE — PROGRESS NOTES
Suzanne Coelho is a 80 y.o. female who was seen by synchronous (real-time) audio-video technology on 5/19/2020. Consent: Suzanne Coelho, who was seen by synchronous (real-time) audio-video technology, and/or her healthcare decision maker, is aware that this patient-initiated, Telehealth encounter on 5/19/2020 is a billable service, with coverage as determined by her insurance carrier. She is aware that she may receive a bill and has provided verbal consent to proceed: Yes. Dr Kristy Blair was on medical leave and then covid hit the area. Pt had a diarrhea episode and was hospitalized for IV fluids. Now Ortho x 2 opinions that bone is on bone and 2 injections for ankle pain and there is no help. Surgery at 80 yrs old is not an option. Pt cannot see Dr. Kristy Blair at this time but will schedule appt in the future. In the meantime ultram helps so we will prescribe as PCP until Lisa is avialajohn. Assessment & Plan:     Orders Placed This Encounter    traMADoL (ULTRAM) 50 mg tablet     Sig: Take 1 Tab by mouth two (2) times daily as needed for Pain for up to 45 days. Max Daily Amount: 100 mg. Dispense:  45 Tab     Refill:  1       ICD-10-CM ICD-9-CM    1. Ankle arthritis M19.079 716.97 traMADoL (ULTRAM) 50 mg tablet     Follow-up and Dispositions    · Return in about 3 months (around 8/19/2020) for pain medication if needed.  accurate, this pain medication will be done by me until Dr. Kristy Blair returns. I spent at least 23 minutes on this visit with this established patient. ([id number])    Subjective:   Suzanne Coelho is a 80 y.o. female who was seen for Ankle Pain (Arthritis      On going problem)      Prior to Admission medications    Medication Sig Start Date End Date Taking? Authorizing Provider   traMADoL (ULTRAM) 50 mg tablet Take 1 Tab by mouth daily as needed for Pain for up to 30 days.  5/11/20 6/10/20 Yes Lisa Polanco MD   levothyroxine (SYNTHROID) 25 mcg tablet TAKE 1 TABLET BY MOUTH DAILY BEFORE BREAKFAST 1/8/20  Yes iDann Stubbs MD   diclofenac (VOLTAREN) 1 % gel CHERYL 2 GRAMS EXT AA Q 8 H FOR 21 DAYS 11/22/19  Yes Provider, Historical   metoprolol succinate (TOPROL-XL) 25 mg XL tablet TK 1 T PO ONCE A DAY 12/16/19  Yes Provider, Historical   ELIQUIS 2.5 mg tablet TK UTD PO  BID 5/20/19  Yes Provider, Historical   vit A/vit C/vit E/zinc/copper (PRESERVISION AREDS PO) Take  by mouth. Yes Provider, Historical   MULTIVITAMIN PO Take  by mouth. Yes Provider, Historical   famotidine (PEPCID) 20 mg tablet 10 mg. Yes Provider, Historical   acetaminophen (TYLENOL) 325 mg tablet 650 mg. Yes Provider, Historical   amLODIPine (NORVASC) 10 mg tablet Take 10 mg by mouth once. 8/18/18  Yes Provider, Historical     Allergies   Allergen Reactions    Ace Inhibitors Unknown (comments), Hives and Rash     None noted  None noted  Edema    Lisinopril Other (comments)     Edema             Review of Systems   Constitutional: Negative for fever. Respiratory: Negative for shortness of breath. Cardiovascular: Negative for chest pain, orthopnea and PND. Gastrointestinal: Negative for abdominal pain, nausea and vomiting.        Objective:   Vital Signs: (As obtained by patient/caregiver at home)  Visit Vitals  LMP  (LMP Unknown)          Constitutional: [x] Appears well-developed and well-nourished [x] No apparent distress      [] Abnormal -     Mental status: [x] Alert and awake  [x] Oriented to person/place/time [x] Able to follow commands    [] Abnormal -     Eyes:   EOM    [x]  Normal    [] Abnormal -   Sclera  [x]  Normal    [] Abnormal -          Discharge []  None visible   [] Abnormal -     HENT: [x] Normocephalic, atraumatic  [] Abnormal -   [] Mouth/Throat: Mucous membranes are moist    External Ears [x] Normal  [] Abnormal -    Neck: [x] No visualized mass [] Abnormal -     Pulmonary/Chest: [x] Respiratory effort normal   [x] No visualized signs of difficulty breathing or respiratory distress        [] Abnormal -      Musculoskeletal:   [x] Normal gait with no signs of ataxia         [] Normal range of motion of neck        [] Abnormal -     Neurological:        [x] No Facial Asymmetry (Cranial nerve 7 motor function) (limited exam due to video visit)          [] No gaze palsy        [] Abnormal -          Skin:        [] No significant exanthematous lesions or discoloration noted on facial skin         [] Abnormal -            Psychiatric:       [x] Normal Affect [] Abnormal -        [] No Hallucinations    Other pertinent observable physical exam findings:-        We discussed the expected course, resolution and complications of the diagnosis(es) in detail. Medication risks, benefits, costs, interactions, and alternatives were discussed as indicated. I advised her to contact the office if her condition worsens, changes or fails to improve as anticipated. She expressed understanding with the diagnosis(es) and plan. Riaz Villalta is a 80 y.o. female who was evaluated by a video visit encounter for concerns as above. Patient identification was verified prior to start of the visit. A caregiver was present when appropriate. Due to this being a TeleHealth encounter ([facility]), evaluation of the following organ systems was limited: Vitals/Constitutional/EENT/Resp/CV/GI//MS/Neuro/Skin/Heme-Lymph-Imm. Pursuant to the emergency declaration under the [facility] waiver authority and the [employer] General Act, this Virtual  Visit was conducted, with patient's (and/or legal guardian's) consent, to reduce the patient's risk of exposure to COVID-19 and provide necessary medical care. Services were provided through a video synchronous discussion virtually to substitute for in-person clinic visit. Pt is at her home and I am in my office.     Shara Long MD

## 2020-05-20 ENCOUNTER — DOCUMENTATION ONLY (OUTPATIENT)
Dept: FAMILY MEDICINE CLINIC | Age: 85
End: 2020-05-20

## 2020-07-06 RX ORDER — LEVOTHYROXINE SODIUM 25 UG/1
25 TABLET ORAL
Qty: 90 TAB | Refills: 1 | Status: SHIPPED | OUTPATIENT
Start: 2020-07-06 | End: 2021-01-21 | Stop reason: SDUPTHER

## 2021-01-12 ENCOUNTER — OFFICE VISIT (OUTPATIENT)
Dept: FAMILY MEDICINE CLINIC | Age: 86
End: 2021-01-12
Payer: COMMERCIAL

## 2021-01-12 VITALS
SYSTOLIC BLOOD PRESSURE: 124 MMHG | BODY MASS INDEX: 23.81 KG/M2 | HEIGHT: 60 IN | DIASTOLIC BLOOD PRESSURE: 80 MMHG | TEMPERATURE: 98 F | HEART RATE: 115 BPM | OXYGEN SATURATION: 98 % | RESPIRATION RATE: 16 BRPM

## 2021-01-12 DIAGNOSIS — G47.00 INSOMNIA, UNSPECIFIED TYPE: Primary | ICD-10-CM

## 2021-01-12 DIAGNOSIS — Z09 HOSPITAL DISCHARGE FOLLOW-UP: ICD-10-CM

## 2021-01-12 DIAGNOSIS — Z98.890 STATUS POST HIP SURGERY: ICD-10-CM

## 2021-01-12 PROCEDURE — 1090F PRES/ABSN URINE INCON ASSESS: CPT | Performed by: FAMILY MEDICINE

## 2021-01-12 PROCEDURE — 99214 OFFICE O/P EST MOD 30 MIN: CPT | Performed by: FAMILY MEDICINE

## 2021-01-12 PROCEDURE — G8427 DOCREV CUR MEDS BY ELIG CLIN: HCPCS | Performed by: FAMILY MEDICINE

## 2021-01-12 PROCEDURE — G8536 NO DOC ELDER MAL SCRN: HCPCS | Performed by: FAMILY MEDICINE

## 2021-01-12 PROCEDURE — 1111F DSCHRG MED/CURRENT MED MERGE: CPT | Performed by: FAMILY MEDICINE

## 2021-01-12 PROCEDURE — 1101F PT FALLS ASSESS-DOCD LE1/YR: CPT | Performed by: FAMILY MEDICINE

## 2021-01-12 PROCEDURE — G8420 CALC BMI NORM PARAMETERS: HCPCS | Performed by: FAMILY MEDICINE

## 2021-01-12 PROCEDURE — G8432 DEP SCR NOT DOC, RNG: HCPCS | Performed by: FAMILY MEDICINE

## 2021-01-12 RX ORDER — HYDROCODONE BITARTRATE AND ACETAMINOPHEN 5; 325 MG/1; MG/1
TABLET ORAL
COMMUNITY
Start: 2021-01-06 | End: 2021-01-12 | Stop reason: SDUPTHER

## 2021-01-12 RX ORDER — HYDROCODONE BITARTRATE AND ACETAMINOPHEN 5; 325 MG/1; MG/1
1 TABLET ORAL
Qty: 56 TAB | Refills: 0 | Status: SHIPPED | OUTPATIENT
Start: 2021-01-12 | End: 2021-01-26

## 2021-01-12 RX ORDER — ZOLPIDEM TARTRATE 5 MG/1
TABLET ORAL
COMMUNITY
Start: 2021-01-06 | End: 2022-07-11 | Stop reason: ALTCHOICE

## 2021-01-12 RX ORDER — ZOLPIDEM TARTRATE 5 MG/1
5 TABLET ORAL
Qty: 30 TAB | Refills: 1 | Status: SHIPPED | OUTPATIENT
Start: 2021-01-12 | End: 2021-02-08

## 2021-01-15 ENCOUNTER — VIRTUAL VISIT (OUTPATIENT)
Dept: FAMILY MEDICINE CLINIC | Age: 86
End: 2021-01-15
Payer: COMMERCIAL

## 2021-01-15 DIAGNOSIS — R21 RASH AND NONSPECIFIC SKIN ERUPTION: Primary | ICD-10-CM

## 2021-01-15 PROCEDURE — 1090F PRES/ABSN URINE INCON ASSESS: CPT | Performed by: NURSE PRACTITIONER

## 2021-01-15 PROCEDURE — 1101F PT FALLS ASSESS-DOCD LE1/YR: CPT | Performed by: NURSE PRACTITIONER

## 2021-01-15 PROCEDURE — 99213 OFFICE O/P EST LOW 20 MIN: CPT | Performed by: NURSE PRACTITIONER

## 2021-01-15 PROCEDURE — G8432 DEP SCR NOT DOC, RNG: HCPCS | Performed by: NURSE PRACTITIONER

## 2021-01-15 PROCEDURE — G8427 DOCREV CUR MEDS BY ELIG CLIN: HCPCS | Performed by: NURSE PRACTITIONER

## 2021-01-15 RX ORDER — TRIAMCINOLONE ACETONIDE 1 MG/G
OINTMENT TOPICAL 3 TIMES DAILY
Qty: 80 G | Refills: 1 | Status: SHIPPED | OUTPATIENT
Start: 2021-01-15 | End: 2021-04-26

## 2021-01-15 NOTE — PROGRESS NOTES
Krista Gomez is a 80 y.o. female who was seen by synchronous (real-time) audio-video technology on 1/15/2021 for Rash        Assessment & Plan:   Diagnoses and all orders for this visit:    1. Rash and nonspecific skin eruption  -     triamcinolone acetonide (KENALOG) 0.1 % ointment; Apply  to affected area three (3) times daily. use thin layer and apply 3 times daily. Pt seen by VV. Unable to get camera turned to see rash. Pt and daughter report the rash is the sixe of her hand, slightly worse from Tuesday visit with Dr Roc Farrell. Pt wound from Hip replacement does not appear to be infected per daughter who is a RN. No drainage from wound. The rash per the daughter has come from reaction to bandages, reports she has very sensitive skin. I contacted Dr Roc Farrell since he had seen irritation, advised to try Kenalog cream TID. Follow up Monday with surgeon. Go to ER if worsens or dev a fever. I spent at least 20 minutes on this visit with this established patient. Subjective:       Prior to Admission medications    Medication Sig Start Date End Date Taking? Authorizing Provider   triamcinolone acetonide (KENALOG) 0.1 % ointment Apply  to affected area three (3) times daily. use thin layer and apply 3 times daily. 1/15/21  Yes Melodie Aguilera NP   hydrocortisone 0.5 % lotion Apply  to affected area. Provider, Historical   zolpidem (AMBIEN) 5 mg tablet TAKE 1 TABLET BY MOUTH AT BEDTIME AS NEEDED FOR INSOMNIA 1/6/21   Provider, Historical   zolpidem (AMBIEN) 5 mg tablet Take 1 Tab by mouth nightly as needed for Sleep. Max Daily Amount: 5 mg. 1/12/21   Bud Mohs, MD   HYDROcodone-acetaminophen [facility]) 5-325 mg per tablet Take 1 Tab by mouth every six (6) hours as needed for Pain for up to 14 days. Max Daily Amount: 4 Tabs. 1/12/21 1/26/21  Bud Mohs, MD   levothyroxine (SYNTHROID) 25 mcg tablet Take 1 Tab by mouth Daily (before breakfast).  7/6/20   Bud Mohs, MD   metoprolol succinate (TOPROL-XL) 25 mg XL tablet two (2) times a day. 12/16/19   Provider, Historical   ELIQUIS 2.5 mg tablet TK UTD PO  BID 5/20/19   Provider, Historical   vit A/vit C/vit E/zinc/copper (PRESERVISION AREDS PO) Take  by mouth. Provider, Historical   famotidine (PEPCID) 20 mg tablet 10 mg.    Provider, Historical   amLODIPine (NORVASC) 10 mg tablet Take 10 mg by mouth once. 8/18/18   Provider, Historical     Patient Active Problem List   Diagnosis Code    Other insomnia G47.09    Essential hypertension I10    Acquired hypothyroidism E03.9    Atrial fibrillation ([facility] I48.91     Patient Active Problem List    Diagnosis Date Noted    Atrial fibrillation ([facility] 04/27/2020    Other insomnia 09/04/2018    Essential hypertension 09/04/2018    Acquired hypothyroidism 09/04/2018     Current Outpatient Medications   Medication Sig Dispense Refill    triamcinolone acetonide (KENALOG) 0.1 % ointment Apply  to affected area three (3) times daily. use thin layer and apply 3 times daily. 80 g 1    hydrocortisone 0.5 % lotion Apply  to affected area.  zolpidem (AMBIEN) 5 mg tablet TAKE 1 TABLET BY MOUTH AT BEDTIME AS NEEDED FOR INSOMNIA      zolpidem (AMBIEN) 5 mg tablet Take 1 Tab by mouth nightly as needed for Sleep. Max Daily Amount: 5 mg. 30 Tab 1    HYDROcodone-acetaminophen (NORCO) 5-325 mg per tablet Take 1 Tab by mouth every six (6) hours as needed for Pain for up to 14 days. Max Daily Amount: 4 Tabs. 56 Tab 0    levothyroxine (SYNTHROID) 25 mcg tablet Take 1 Tab by mouth Daily (before breakfast). 90 Tab 1    metoprolol succinate (TOPROL-XL) 25 mg XL tablet two (2) times a day.  ELIQUIS 2.5 mg tablet TK UTD PO  BID  3    vit A/vit C/vit E/zinc/copper (PRESERVISION AREDS PO) Take  by mouth.  famotidine (PEPCID) 20 mg tablet 10 mg.      amLODIPine (NORVASC) 10 mg tablet Take 10 mg by mouth once.        Allergies   Allergen Reactions    Ace Inhibitors Unknown (comments), Hives and Rash None noted  None noted  Edema    Lisinopril Other (comments)     Edema       Past Medical History:   Diagnosis Date    Anxiety     [facility]     Dysphagia     Insomnia     contract signed 11/2017    Macular degeneration     Osteoarthritis     Pancreatitis 2010    Pneumonia 2016    [facility]VD [facility]peripheral [facility] disease) ([facility]     SIADH (syndrome of inappropriate ADH production) ([facility]     Vertigo        Review of Systems   Constitutional: Negative for fever and malaise/fatigue. HENT: Negative for congestion, sinus pain and sore throat. Respiratory: Negative for cough and shortness of breath. Cardiovascular: Negative for chest pain. Gastrointestinal: Negative for diarrhea, nausea and vomiting. Genitourinary: Negative for dysuria. Musculoskeletal: Negative. Skin: Positive for rash. Neurological: Negative for dizziness and headaches. Endo/Heme/Allergies: Negative for environmental allergies. Psychiatric/Behavioral: Negative. Objective:   No flowsheet data found.      [INSTRUCTIONS:  \"[x]\" Indicates a positive item  \"[]\" Indicates a negative item  -- DELETE ALL ITEMS NOT EXAMINED]    Constitutional: [x] Appears well-developed and well-nourished [x] No apparent distress      [] Abnormal -     Mental status: [x] Alert and awake  [x] Oriented to person/place/time [x] Able to follow commands    [] Abnormal -     Eyes:   EOM    [x]  Normal    [] Abnormal -   Sclera  [x]  Normal    [] Abnormal -          Discharge [x]  None visible   [] Abnormal -     HENT: [x] Normocephalic, atraumatic  [] Abnormal -   [x] Mouth/Throat: Mucous membranes are moist    External Ears [x] Normal  [] Abnormal -    Neck: [x] No visualized mass [] Abnormal -     Pulmonary/Chest: [x] Respiratory effort normal   [x] No visualized signs of difficulty breathing or respiratory distress        [] Abnormal -      Musculoskeletal:   [x] Normal gait with no signs of ataxia         [x] Normal range of motion of neck        [] Abnormal -     Neurological:        [x] No Facial Asymmetry (Cranial nerve 7 motor function) (limited exam due to video visit)          [x] No gaze palsy        [] Abnormal -          Skin:        [x] No significant exanthematous lesions or discoloration noted on facial skin         [] Abnormal -            Psychiatric:       [x] Normal Affect [] Abnormal -        [x] No Hallucinations    Other pertinent observable physical exam findings:-        We discussed the expected course, resolution and complications of the diagnosis(es) in detail. Medication risks, benefits, costs, interactions, and alternatives were discussed as indicated. I advised her to contact the office if her condition worsens, changes or fails to improve as anticipated. She expressed understanding with the diagnosis(es) and plan. Riaz Villalta, who was evaluated through a patient-initiated, synchronous (real-time) audio-video encounter, and/or her healthcare decision maker, is aware that it is a billable service, with coverage as determined by her insurance carrier. She provided verbal consent to proceed: Yes, and patient identification was verified. It was conducted pursuant to the emergency declaration under the [facility], [address] [place] [address] authority and the [employer] General Act. A caregiver was present when appropriate. Ability to conduct physical exam was limited. I was at home. The patient was at home.       Poli Hutchison NP

## 2021-01-21 NOTE — TELEPHONE ENCOUNTER
Received fax from pharmacy requesting refill. Wanted to know if a 90-day supply was authorized as well.

## 2021-01-22 RX ORDER — LEVOTHYROXINE SODIUM 25 UG/1
25 TABLET ORAL
Qty: 90 TAB | Refills: 1 | Status: SHIPPED | OUTPATIENT
Start: 2021-01-22 | End: 2021-07-18

## 2021-02-08 ENCOUNTER — OFFICE VISIT (OUTPATIENT)
Dept: FAMILY MEDICINE CLINIC | Age: 86
End: 2021-02-08
Payer: COMMERCIAL

## 2021-02-08 VITALS
SYSTOLIC BLOOD PRESSURE: 125 MMHG | DIASTOLIC BLOOD PRESSURE: 66 MMHG | RESPIRATION RATE: 16 BRPM | BODY MASS INDEX: 23.75 KG/M2 | OXYGEN SATURATION: 98 % | WEIGHT: 121 LBS | HEART RATE: 80 BPM | HEIGHT: 60 IN | TEMPERATURE: 97.1 F

## 2021-02-08 DIAGNOSIS — M25.551 HIP PAIN, RIGHT: Primary | ICD-10-CM

## 2021-02-08 PROCEDURE — G8510 SCR DEP NEG, NO PLAN REQD: HCPCS | Performed by: FAMILY MEDICINE

## 2021-02-08 PROCEDURE — 1101F PT FALLS ASSESS-DOCD LE1/YR: CPT | Performed by: FAMILY MEDICINE

## 2021-02-08 PROCEDURE — 1090F PRES/ABSN URINE INCON ASSESS: CPT | Performed by: FAMILY MEDICINE

## 2021-02-08 PROCEDURE — G8420 CALC BMI NORM PARAMETERS: HCPCS | Performed by: FAMILY MEDICINE

## 2021-02-08 PROCEDURE — G8427 DOCREV CUR MEDS BY ELIG CLIN: HCPCS | Performed by: FAMILY MEDICINE

## 2021-02-08 PROCEDURE — G8536 NO DOC ELDER MAL SCRN: HCPCS | Performed by: FAMILY MEDICINE

## 2021-02-08 PROCEDURE — 99213 OFFICE O/P EST LOW 20 MIN: CPT | Performed by: FAMILY MEDICINE

## 2021-02-08 RX ORDER — BUMETANIDE 1 MG/1
TABLET ORAL DAILY
COMMUNITY
Start: 2021-02-04 | End: 2021-04-26

## 2021-02-08 RX ORDER — HYDROCODONE BITARTRATE AND ACETAMINOPHEN 5; 325 MG/1; MG/1
1 TABLET ORAL EVERY 12 HOURS
Qty: 60 TAB | Refills: 0 | Status: SHIPPED | OUTPATIENT
Start: 2021-02-08 | End: 2021-02-08 | Stop reason: SDUPTHER

## 2021-02-08 RX ORDER — HYDROCODONE BITARTRATE AND ACETAMINOPHEN 5; 325 MG/1; MG/1
1 TABLET ORAL EVERY 12 HOURS
Qty: 60 TAB | Refills: 0 | Status: SHIPPED | OUTPATIENT
Start: 2021-02-08 | End: 2021-03-09

## 2021-02-08 NOTE — PROGRESS NOTES
1. Have you been to the ER, urgent care clinic since your last visit? Hospitalized since your last visit? No    2. Have you seen or consulted any other health care providers outside of the [address] [place] [address] since your last visit? Include any pap smears or colon screening. No     Chief Complaint   Patient presents with    Medication Refill     Visit Vitals  /66 (BP 1 Location: Left upper arm, BP Patient Position: Sitting, BP Cuff Size: Adult)   Pulse 80   Temp 97.1 °F (36.2 °C) (Skin)   Resp 16   Ht 5' (1.524 m)   Wt 121 lb (54.9 kg)   SpO2 98%   BMI 23.63 kg/m²     Pharmacy verified. [facility] [id number] #[id number] - [place], [place] -  [address]     3 most recent PHQ Screens 2/8/2021   Little interest or pleasure in doing things Not at all   Feeling down, depressed, irritable, or hopeless Not at all   Total Score PHQ 2 0     Health Maintenance Due   Topic Date Due    DTaP/Tdap/Td series (1 - Tdap) 11/06/1944    GLAUCOMA SCREENING Q2Y  11/06/1988    Pneumococcal 65+ years (1 of 1 - PPSV23) 11/06/1988    Shingrix Vaccine Age 50> (2 of 2) 12/23/2019    Flu Vaccine (1) 09/01/2020

## 2021-02-08 NOTE — PROGRESS NOTES
[address] [facility]  [address] , [address] [facility], [address] [place] [address]  Dr. Ima Dancer. Aurelia Shaw  Phone:  [phone]  Fax:  [phone]    Progress Note    Name:  Efrain Del Castillo  Age:  80 y.o.  :  1923  Encounter Date:  2021    Primary Care Provider:  Ellen Shen MD    Chief Complaint   Patient presents with    Medication Refill     HPI:  Patient here to follow-up right hip pain, S/P surgery and determine medication refill and adjustments and appropriate lab evaluation. Patient is compliant with medications and no new side effects. Past Medical History:   Diagnosis Date    Anxiety     Atrial fibrillation [facility])  Dr Robert Rae Dysphagia     Eczema  Dr. Bhavani Byrd Insomnia     contract signed 2017    Macular degeneration     Osteoarthritis     Pancreatitis     Pneumonia     [facility]) [facility]     SIADH (syndrome of inappropriate ADH production) ([facility]     Vertigo      Past Surgical History:   Procedure Laterality Date    HX CHOLECYSTECTOMY      HX GYN       Family History   Problem Relation Age of Onset    Heart Disease Mother     Stroke Father     Cancer Sister      Social History     Socioeconomic History    Marital status:      Spouse name: Not on file    Number of children: Not on file    Years of education: Not on file    Highest education level: Not on file   Tobacco Use    Smoking status: Never Smoker    Smokeless tobacco: Never Used   Substance and Sexual Activity    Alcohol use: Not Currently     Alcohol/week: 1.0 standard drinks     Types: 1 Glasses of wine per week    Drug use: No    Sexual activity: Not Currently       Current Outpatient Medications   Medication Sig Dispense Refill    bumetanide (BUMEX) 1 mg tablet daily.  HYDROcodone-acetaminophen (NORCO) 5-325 mg per tablet Take 1 Tab by mouth every twelve (12) hours for 29 days. Max Daily Amount: 2 Tabs.  60 Tab 0    levothyroxine (SYNTHROID) 25 mcg tablet Take 1 Tab by mouth Daily (before breakfast). 90 Tab 1    triamcinolone acetonide (KENALOG) 0.1 % ointment Apply  to affected area three (3) times daily. use thin layer and apply 3 times daily. 80 g 1    zolpidem (AMBIEN) 5 mg tablet TAKE 1 TABLET BY MOUTH AT BEDTIME AS NEEDED FOR INSOMNIA      metoprolol succinate (TOPROL-XL) 25 mg XL tablet two (2) times a day.  ELIQUIS 2.5 mg tablet TK UTD PO  BID  3    vit A/vit C/vit E/zinc/copper (PRESERVISION AREDS PO) Take  by mouth.  famotidine (PEPCID) 20 mg tablet 10 mg. Allergies   Allergen Reactions    Ace Inhibitors Unknown (comments), Hives and Rash     None noted  None noted  Edema    Lisinopril Other (comments)     Edema       Patient Active Problem List   Diagnosis Code    Other insomnia G47.09    Essential hypertension I10    Acquired hypothyroidism E03.9    Atrial fibrillation (HCC) I48.91       Review of Systems   Constitutional: Negative for fever. Respiratory: Negative for shortness of breath. Cardiovascular: Negative for chest pain, orthopnea and PND. Gastrointestinal: Negative for abdominal pain, nausea and vomiting. Visit Vitals  /66 (BP 1 Location: Left upper arm, BP Patient Position: Sitting, BP Cuff Size: Adult)   Pulse 80   Temp 97.1 °F (36.2 °C) (Skin)   Resp 16   Ht 5' (1.524 m)   Wt 121 lb (54.9 kg)   LMP  (LMP Unknown)   SpO2 98%   BMI 23.63 kg/m²     Physical Exam  Cardiovascular:      Rate and Rhythm: Normal rate and regular rhythm. Heart sounds: Normal heart sounds. Pulmonary:      Effort: Pulmonary effort is normal. No respiratory distress. Breath sounds: Normal breath sounds. No wheezing. Skin:     General: Skin is warm and dry. Neurological:      Mental Status: She is alert and oriented to person, place, and time. Labs/Radiology Reviewed    Assessment/Plan:    ICD-10-CM ICD-9-CM    1.  Hip pain, right  M25.551 719.45 HYDROcodone-acetaminophen (NORCO) 5-325 mg per tablet      DISCONTINUED: HYDROcodone-acetaminophen (NORCO) 5-325 mg per tablet       Orders Placed This Encounter    DISCONTD: HYDROcodone-acetaminophen (NORCO) 5-325 mg per tablet    HYDROcodone-acetaminophen (NORCO) 5-325 mg per tablet       Follow-up and Dispositions    · Return in about 2 months (around 4/8/2021). Pt doing home health and Pt and recovering from surgery.    accurate,    Frantz Kim MD  2/8/2021

## 2021-04-26 ENCOUNTER — OFFICE VISIT (OUTPATIENT)
Dept: FAMILY MEDICINE CLINIC | Age: 86
End: 2021-04-26
Payer: COMMERCIAL

## 2021-04-26 VITALS
TEMPERATURE: 97.3 F | OXYGEN SATURATION: 98 % | HEART RATE: 77 BPM | BODY MASS INDEX: 23.75 KG/M2 | HEIGHT: 60 IN | WEIGHT: 121 LBS | RESPIRATION RATE: 16 BRPM | DIASTOLIC BLOOD PRESSURE: 72 MMHG | SYSTOLIC BLOOD PRESSURE: 157 MMHG

## 2021-04-26 DIAGNOSIS — I50.9 CONGESTIVE HEART FAILURE, UNSPECIFIED HF CHRONICITY, UNSPECIFIED HEART FAILURE TYPE (HCC): ICD-10-CM

## 2021-04-26 DIAGNOSIS — M19.90 ARTHRITIS: ICD-10-CM

## 2021-04-26 DIAGNOSIS — I10 ESSENTIAL HYPERTENSION: Primary | ICD-10-CM

## 2021-04-26 LAB
ALBUMIN SERPL-MCNC: 3.8 G/DL (ref 3.5–5)
ALBUMIN/GLOB SERPL: 1.2 {RATIO} (ref 1.1–2.2)
ALP SERPL-CCNC: 134 U/L (ref 45–117)
ALT SERPL-CCNC: 26 U/L (ref 12–78)
ANION GAP SERPL CALC-SCNC: 8 MMOL/L (ref 5–15)
AST SERPL-CCNC: 23 U/L (ref 15–37)
BILIRUB SERPL-MCNC: 0.8 MG/DL (ref 0.2–1)
BUN SERPL-MCNC: 17 MG/DL (ref 6–20)
BUN/CREAT SERPL: 31 (ref 12–20)
CALCIUM SERPL-MCNC: 9.5 MG/DL (ref 8.5–10.1)
CHLORIDE SERPL-SCNC: 103 MMOL/L (ref 97–108)
CO2 SERPL-SCNC: 26 MMOL/L (ref 21–32)
CREAT SERPL-MCNC: 0.54 MG/DL (ref 0.55–1.02)
ERYTHROCYTE [DISTWIDTH] IN BLOOD BY AUTOMATED COUNT: 17.3 % (ref 11.5–14.5)
GLOBULIN SER CALC-MCNC: 3.2 G/DL (ref 2–4)
GLUCOSE SERPL-MCNC: 108 MG/DL (ref 65–100)
HCT VFR BLD AUTO: 39.8 % (ref 35–47)
HGB BLD-MCNC: 12.5 G/DL (ref 11.5–16)
MCH RBC QN AUTO: 28 PG (ref 26–34)
MCHC RBC AUTO-ENTMCNC: 31.4 G/DL (ref 30–36.5)
MCV RBC AUTO: 89.2 FL (ref 80–99)
NRBC # BLD: 0 K/UL (ref 0–0.01)
NRBC BLD-RTO: 0 PER 100 WBC
PLATELET # BLD AUTO: 314 K/UL (ref 150–400)
PMV BLD AUTO: 9.3 FL (ref 8.9–12.9)
POTASSIUM SERPL-SCNC: 3.9 MMOL/L (ref 3.5–5.1)
PROT SERPL-MCNC: 7 G/DL (ref 6.4–8.2)
RBC # BLD AUTO: 4.46 M/UL (ref 3.8–5.2)
SODIUM SERPL-SCNC: 137 MMOL/L (ref 136–145)
WBC # BLD AUTO: 7.5 K/UL (ref 3.6–11)

## 2021-04-26 PROCEDURE — 99213 OFFICE O/P EST LOW 20 MIN: CPT | Performed by: FAMILY MEDICINE

## 2021-04-26 PROCEDURE — 1090F PRES/ABSN URINE INCON ASSESS: CPT | Performed by: FAMILY MEDICINE

## 2021-04-26 PROCEDURE — 1101F PT FALLS ASSESS-DOCD LE1/YR: CPT | Performed by: FAMILY MEDICINE

## 2021-04-26 PROCEDURE — G8510 SCR DEP NEG, NO PLAN REQD: HCPCS | Performed by: FAMILY MEDICINE

## 2021-04-26 PROCEDURE — G8427 DOCREV CUR MEDS BY ELIG CLIN: HCPCS | Performed by: FAMILY MEDICINE

## 2021-04-26 PROCEDURE — G8536 NO DOC ELDER MAL SCRN: HCPCS | Performed by: FAMILY MEDICINE

## 2021-04-26 PROCEDURE — G8420 CALC BMI NORM PARAMETERS: HCPCS | Performed by: FAMILY MEDICINE

## 2021-04-26 RX ORDER — BUMETANIDE 1 MG/1
0.5 TABLET ORAL DAILY
Qty: 45 TAB | Refills: 0
Start: 2021-04-26 | End: 2022-04-11

## 2021-04-26 RX ORDER — TRAMADOL HYDROCHLORIDE 50 MG/1
TABLET ORAL
COMMUNITY
Start: 2021-02-28 | End: 2021-04-26 | Stop reason: SDUPTHER

## 2021-04-26 RX ORDER — DEXTROMETHORPHAN HYDROBROMIDE, GUAIFENESIN 5; 100 MG/5ML; MG/5ML
650 LIQUID ORAL EVERY 8 HOURS
COMMUNITY

## 2021-04-26 RX ORDER — AMLODIPINE BESYLATE 5 MG/1
5 TABLET ORAL DAILY
COMMUNITY

## 2021-04-26 RX ORDER — TRAMADOL HYDROCHLORIDE 50 MG/1
50 TABLET ORAL
Qty: 60 TAB | Refills: 0 | Status: SHIPPED | OUTPATIENT
Start: 2021-04-26 | End: 2021-05-26

## 2021-04-26 NOTE — PATIENT INSTRUCTIONS
Measure weight daily. If > 120 lbs or if ankle swelling is present or if shortness on breath is noticed at nighttime or daytime with activity, then increase dose of bumex to a full pill until symptoms have resolved and weight back to 115.

## 2021-04-26 NOTE — PROGRESS NOTES
1. Have you been to the ER, urgent care clinic since your last visit? Hospitalized since your last visit? Yes When: 4/2021 Where: [facility] Reason for visit: fall injury    2. Have you seen or consulted any other health care providers outside of the [address] [place] [address] since your last visit? Include any pap smears or colon screening.  No     Chief Complaint   Patient presents with    Follow-up    Fatigue    Medication Adjustment     Patient reports medication causing her to use bathroom more frequent and dizziness    Labs    Referral Request     PT     Visit Vitals  BP (!) 157/72 (BP 1 Location: Right arm, BP Patient Position: Sitting, BP Cuff Size: Adult)   Pulse 77   Temp 97.3 °F (36.3 °C) (Skin)   Resp 16   Ht 5' (1.524 m)   Wt 121 lb (54.9 kg)   SpO2 98%   BMI 23.63 kg/m²       3 most recent PHQ Screens 4/26/2021   Little interest or pleasure in doing things Not at all   Feeling down, depressed, irritable, or hopeless Not at all   Total Score PHQ 2 0     Learning Assessment 9/4/2018   PRIMARY LEARNER Patient   HIGHEST LEVEL OF EDUCATION - PRIMARY LEARNER  4 YEARS OF COLLEGE   PRIMARY LANGUAGE ENGLISH   LEARNER PREFERENCE PRIMARY READING   ANSWERED BY self   RELATIONSHIP SELF

## 2021-04-26 NOTE — PROGRESS NOTES
[address] [facility]  [address] , [address] [facility], [address] [place] [address]  Dr. Roland Gonzalez. [facility]  Phone:  [phone]  Fax:  [phone]    Progress Note    Name:  Rebeca Taylor  Age:  80 y.o.  :  1923  Encounter Date:  2021    Primary Care Provider:  Tiffany Torre MD    Chief Complaint   Patient presents with    Follow-up    Fatigue    Medication Adjustment     Patient reports medication causing her to use bathroom more frequent and dizziness    Labs    Referral Request     PT     HPI:  Patient here to follow-up pain from arthritis and determine medication refill and adjustments and appropriate lab evaluation. Patient is compliant with medications and no new side effects. She wants to decrease bumex to 1/2 pill a day because of dizziness and weakness that it causes. She has no dyspnea. Past Medical History:   Diagnosis Date    Anxiety     Atrial fibrillation [facility])  Dr Rob Gallegos Dysphagia     Eczema  Dr. Kobi Perdue Insomnia     contract signed 2017    Macular degeneration     Osteoarthritis     Pancreatitis 2010    Pneumonia     PVD (peripheral [facility] disease) ([facility] [facility]     SIADH (syndrome of inappropriate ADH production) ([facility]     Vertigo      Past Surgical History:   Procedure Laterality Date    HX CHOLECYSTECTOMY      HX GYN       Family History   Problem Relation Age of Onset    Heart Disease Mother     Stroke Father     Cancer Sister      Social History     Socioeconomic History    Marital status:      Spouse name: Not on file    Number of children: Not on file    Years of education: Not on file    Highest education level: Not on file   Tobacco Use    Smoking status: Never Smoker    Smokeless tobacco: Never Used   Substance and Sexual Activity    Alcohol use: Yes     Alcohol/week: 1.0 standard drinks     Types: 1 Glasses of wine per week     Comment: occas.      Drug use: No    Sexual activity: Not Currently       Current Outpatient Medications   Medication Sig Dispense Refill    amLODIPine (Norvasc) 5 mg tablet Take 5 mg by mouth daily.  acetaminophen (Tylenol Arthritis Pain) 650 mg TbER Take 650 mg by mouth every eight (8) hours.  traMADoL (ULTRAM) 50 mg tablet Take 1 Tab by mouth two (2) times daily as needed for Pain for up to 30 days. Max Daily Amount: 100 mg. 60 Tab 0    bumetanide (BUMEX) 1 mg tablet Take 0.5 Tabs by mouth daily. 45 Tab 0    levothyroxine (SYNTHROID) 25 mcg tablet Take 1 Tab by mouth Daily (before breakfast). 90 Tab 1    metoprolol succinate (TOPROL-XL) 25 mg XL tablet two (2) times a day.  ELIQUIS 2.5 mg tablet TK UTD PO  BID  3    vit A/vit C/vit E/zinc/copper (PRESERVISION AREDS PO) Take  by mouth.  famotidine (PEPCID) 20 mg tablet 10 mg.      zolpidem (AMBIEN) 5 mg tablet TAKE 1 TABLET BY MOUTH AT BEDTIME AS NEEDED FOR INSOMNIA       Allergies   Allergen Reactions    Ace Inhibitors Unknown (comments), Hives and Rash     None noted  None noted  Edema    Lisinopril Other (comments)     Edema       Patient Active Problem List   Diagnosis Code    Other insomnia G47.09    Essential hypertension I10    Acquired hypothyroidism E03.9    Atrial fibrillation (HCC) I48.91       Review of Systems   Constitutional: Negative for fever. Respiratory: Negative for shortness of breath. Cardiovascular: Negative for chest pain, orthopnea and PND. Gastrointestinal: Negative for abdominal pain, nausea and vomiting. Visit Vitals  BP (!) 157/72 (BP 1 Location: Right arm, BP Patient Position: Sitting, BP Cuff Size: Adult)   Pulse 77   Temp 97.3 °F (36.3 °C) (Skin)   Resp 16   Ht 5' (1.524 m)   Wt 121 lb (54.9 kg)   LMP  (LMP Unknown)   SpO2 98%   BMI 23.63 kg/m²     Physical Exam  Cardiovascular:      Rate and Rhythm: Normal rate and regular rhythm. Heart sounds: Normal heart sounds. Pulmonary:      Effort: Pulmonary effort is normal. No respiratory distress.       Breath sounds: Normal breath sounds. No wheezing. Skin:     General: Skin is warm and dry. Neurological:      Mental Status: She is alert and oriented to person, place, and time. Labs/Radiology Reviewed    Assessment/Plan:    ICD-10-CM ICD-9-CM    1. Essential hypertension  I10 401.9 CBC W/O DIFF      METABOLIC PANEL, COMPREHENSIVE   2. Congestive heart failure, unspecified HF chronicity, unspecified heart failure type (HCC)  I50.9 428.0 CBC W/O DIFF      METABOLIC PANEL, COMPREHENSIVE      bumetanide (BUMEX) 1 mg tablet   3. Arthritis  M19.90 716.90 traMADoL (ULTRAM) 50 mg tablet       Orders Placed This Encounter    CBC W/O DIFF    METABOLIC PANEL, COMPREHENSIVE    traMADoL (ULTRAM) 50 mg tablet    bumetanide (BUMEX) 1 mg tablet       Follow-up and Dispositions    · Return in about 4 weeks (around 5/24/2021), or if symptoms worsen or fail to improve. Dose of Bumex reduced to 1/2 tab/day as per pt request. Measure weight daily. If > 120 lbs or if ankle swelling is present or if shortness on breath is noticed at nighttime or daytime with activity, then increase dose of bumex to a full pill until symptoms have resolved and weight back to 115.          Winston Cadet MD  4/26/2021

## 2021-07-18 RX ORDER — LEVOTHYROXINE SODIUM 25 UG/1
TABLET ORAL
Qty: 90 TABLET | Refills: 1 | Status: SHIPPED | OUTPATIENT
Start: 2021-07-18 | End: 2022-01-19 | Stop reason: SDUPTHER

## 2021-07-29 ENCOUNTER — TELEPHONE (OUTPATIENT)
Dept: FAMILY MEDICINE CLINIC | Age: 86
End: 2021-07-29

## 2021-07-29 NOTE — TELEPHONE ENCOUNTER
----- Message from Yina Silver sent at 7/29/2021 10:06 AM EDT -----  Regarding: Dr. Clint Lopez Message/Vendor Calls    Caller's first and last name: Vasu Anguiano, daughter      Reason for call: Would like another order for physical therapy for balance training and possible strength training. Would also like a bone density scan order.        Callback required yes/no and why: yes, to discuss       Best contact number(s): [phone]      Details to clarify the request: N/A       "[employer]in

## 2021-07-29 NOTE — TELEPHONE ENCOUNTER
Jerome Oliver, can you call and see what they need and where. I'll be happy to send in order.   [facility]

## 2021-07-30 DIAGNOSIS — M19.91 PRIMARY OSTEOARTHRITIS, UNSPECIFIED SITE: Primary | ICD-10-CM

## 2021-07-30 DIAGNOSIS — Z91.81 AT HIGH RISK FOR FALLS: ICD-10-CM

## 2021-07-30 NOTE — TELEPHONE ENCOUNTER
TC to Mrs. Liang daughter- order was fax over to [facility] as well as, she was given Dr Bharat Chaudhary message about the  BMD

## 2021-07-30 NOTE — TELEPHONE ENCOUNTER
I [facility] in [facility]. I looked through her previous records and the DEXA done in 2019 looks pretty good. I don't think she needs to repeat the bone density since we would be very hesitant to treat that even if bone density is gradually getting worse. Preventing falls and PT is much more important and she should do that.   Should continue Vit D and calcium  [facility]

## 2022-01-19 RX ORDER — LEVOTHYROXINE SODIUM 25 UG/1
25 TABLET ORAL
Qty: 90 TABLET | Refills: 1 | Status: SHIPPED | OUTPATIENT
Start: 2022-01-19 | End: 2022-06-14

## 2022-01-19 NOTE — TELEPHONE ENCOUNTER
PCP: Demond Colon MD    Last appt: 4/26/2021  No future appointments. Requested Prescriptions     Pending Prescriptions Disp Refills    levothyroxine (SYNTHROID) 25 mcg tablet 90 Tablet 1     Sig: Take 1 Tablet by mouth Daily (before breakfast).        Prior labs and Blood pressures:  BP Readings from Last 3 Encounters:   04/26/21 (!) 157/72   02/08/21 125/66   01/12/21 124/80     Lab Results   Component Value Date/Time    Sodium 137 04/26/2021 01:33 PM    Potassium 3.9 04/26/2021 01:33 PM    Chloride 103 04/26/2021 01:33 PM    CO2 26 04/26/2021 01:33 PM    Anion gap 8 04/26/2021 01:33 PM    Glucose 108 (H) 04/26/2021 01:33 PM    BUN 17 04/26/2021 01:33 PM    Creatinine 0.54 (L) 04/26/2021 01:33 PM    BUN/Creatinine ratio 31 (H) 04/26/2021 01:33 PM    GFR est AA >60 04/26/2021 01:33 PM    GFR est non-AA >60 04/26/2021 01:33 PM    Calcium 9.5 04/26/2021 01:33 PM     No results found for: HBA1C, OXI4RMHL, SRG5EPNG  Lab Results   Component Value Date/Time    Cholesterol, total 140 09/06/2018 08:33 AM    HDL Cholesterol 41 09/06/2018 08:33 AM    LDL, calculated 66 09/06/2018 08:33 AM    VLDL, calculated 33 09/06/2018 08:33 AM    Triglyceride 166 (H) 09/06/2018 08:33 AM     No results found for: GENE Chambers    Lab Results   Component Value Date/Time    TSH 1.580 06/12/2019 03:04 PM

## 2022-03-19 PROBLEM — I10 ESSENTIAL HYPERTENSION: Status: ACTIVE | Noted: 2018-09-04

## 2022-03-19 PROBLEM — I48.91 ATRIAL FIBRILLATION (HCC): Status: ACTIVE | Noted: 2020-04-27

## 2022-03-19 PROBLEM — E03.9 ACQUIRED HYPOTHYROIDISM: Status: ACTIVE | Noted: 2018-09-04

## 2022-03-19 PROBLEM — G47.09 OTHER INSOMNIA: Status: ACTIVE | Noted: 2018-09-04

## 2022-04-08 ENCOUNTER — TELEPHONE (OUTPATIENT)
Dept: FAMILY MEDICINE CLINIC | Age: 87
End: 2022-04-08

## 2022-04-08 NOTE — TELEPHONE ENCOUNTER
Pt is having edema in left leg with skin breakage.         Pt was added to Siddhartha's schedule on Monday at 1140am.

## 2022-04-11 ENCOUNTER — OFFICE VISIT (OUTPATIENT)
Dept: FAMILY MEDICINE CLINIC | Age: 87
End: 2022-04-11
Payer: COMMERCIAL

## 2022-04-11 VITALS
HEART RATE: 78 BPM | SYSTOLIC BLOOD PRESSURE: 153 MMHG | TEMPERATURE: 98.2 F | HEIGHT: 61 IN | DIASTOLIC BLOOD PRESSURE: 80 MMHG | BODY MASS INDEX: 22.47 KG/M2 | WEIGHT: 119 LBS | OXYGEN SATURATION: 96 % | RESPIRATION RATE: 16 BRPM

## 2022-04-11 DIAGNOSIS — R60.0 LOCALIZED EDEMA: ICD-10-CM

## 2022-04-11 DIAGNOSIS — I50.9 CONGESTIVE HEART FAILURE, UNSPECIFIED HF CHRONICITY, UNSPECIFIED HEART FAILURE TYPE (HCC): ICD-10-CM

## 2022-04-11 DIAGNOSIS — L84 CORN OF TOE: ICD-10-CM

## 2022-04-11 DIAGNOSIS — M19.079 ANKLE ARTHRITIS: Primary | ICD-10-CM

## 2022-04-11 DIAGNOSIS — I48.11 LONGSTANDING PERSISTENT ATRIAL FIBRILLATION (HCC): ICD-10-CM

## 2022-04-11 PROCEDURE — G8420 CALC BMI NORM PARAMETERS: HCPCS | Performed by: FAMILY MEDICINE

## 2022-04-11 PROCEDURE — 99214 OFFICE O/P EST MOD 30 MIN: CPT | Performed by: FAMILY MEDICINE

## 2022-04-11 PROCEDURE — G8432 DEP SCR NOT DOC, RNG: HCPCS | Performed by: FAMILY MEDICINE

## 2022-04-11 PROCEDURE — G8427 DOCREV CUR MEDS BY ELIG CLIN: HCPCS | Performed by: FAMILY MEDICINE

## 2022-04-11 PROCEDURE — 1090F PRES/ABSN URINE INCON ASSESS: CPT | Performed by: FAMILY MEDICINE

## 2022-04-11 PROCEDURE — 1101F PT FALLS ASSESS-DOCD LE1/YR: CPT | Performed by: FAMILY MEDICINE

## 2022-04-11 PROCEDURE — G8536 NO DOC ELDER MAL SCRN: HCPCS | Performed by: FAMILY MEDICINE

## 2022-04-11 RX ORDER — BUMETANIDE 1 MG/1
2 TABLET ORAL DAILY
Qty: 90 TABLET | Refills: 1
Start: 2022-04-11

## 2022-04-11 NOTE — PROGRESS NOTES
Magalis Rubin  80 y.o. female  11/6/1923  [id number]  [facility] MEDICINE  Progress Note     Encounter Date: 4/11/2022    Assessment and Plan:     Encounter Diagnoses     ICD-10-CM ICD-9-CM   1. Ankle arthritis  M19.079 716.97   2. Congestive heart failure, unspecified HF chronicity, unspecified heart failure type (HCC)  I50.9 428.0   3. Longstanding persistent atrial fibrillation (HCC)  I48.11 427.31   4. Localized edema  R60.0 782.3   5. Corn of toe  L84 700       1. Ankle arthritis  Most of edema likely from arthritis since swelling is localized  Doubt DVT since on chronic Eliquis  Doubt CHF as cause since edema is unilateral.  Doubt amlodipine is cause since unilatearl    2. Congestive heart failure, unspecified HF chronicity, unspecified heart failure type ([address]  Compensated  No VASQUEZ or chest pain  - bumetanide (BUMEX) 1 mg tablet; Take 2 Tablets by mouth daily. Dispense: 90 Tablet; Refill: 1  Would stay on current dose of bumex since this has helped weeping edema and kidneys are good  Try compression stockings  Try recumbent rest every afternoon. 3. Longstanding persistent atrial fibrillation (HCC)  On Eliquis  Rate OK    4. Localized edema  As above    5. Corn of toe  Trimmed corn right second toe  Corn pads      I have discussed the diagnosis with the patient and the intended plan as seen in the above orders. she has expressed understanding. The patient has received an after-visit summary and questions were answered concerning future plans. I have discussed medication side effects and warnings with the patient as well. Electronically Signed: Avelina Carrero MD    Current Medications after this visit     Current Outpatient Medications   Medication Sig    bumetanide (BUMEX) 1 mg tablet Take 2 Tablets by mouth daily.  levothyroxine (SYNTHROID) 25 mcg tablet Take 1 Tablet by mouth Daily (before breakfast).  amLODIPine (Norvasc) 5 mg tablet Take 5 mg by mouth daily.     acetaminophen (Tylenol Arthritis Pain) 650 mg TbER Take 650 mg by mouth every eight (8) hours.  zolpidem (AMBIEN) 5 mg tablet TAKE 1 TABLET BY MOUTH AT BEDTIME AS NEEDED FOR INSOMNIA    metoprolol succinate (TOPROL-XL) 25 mg XL tablet two (2) times a day.  ELIQUIS 2.5 mg tablet TK UTD PO  BID    vit A/vit C/vit E/zinc/copper (PRESERVISION AREDS PO) Take  by mouth.  famotidine (PEPCID) 20 mg tablet 10 mg. No current facility-administered medications for this visit. Medications Discontinued During This Encounter   Medication Reason    bumetanide (BUMEX) 1 mg tablet      ~~~~~~~~~~~~~~~~~~~~~~~~~~~~~~~~~~~~~~~~~~~~~~~~~~~~~~~~~~~    Chief Complaint   Patient presents with    Ankle swelling     Left ankle swelling with some drainage - heart doctor increased bumex swelling has came down some     Callouses     Painful corn on right foot second toe        History provided by patient and granddaughter, Noemi Bowen  History of Present Illness   Ashlie Mijares is a 80 y.o. female who presents to clinic today for:  Ankle swelling (Left ankle swelling with some drainage - heart doctor increased bumex swelling has came down some ) and Callouses (Painful corn on right foot second toe )    Edema left leg and ankle swelling two years  Was given order for lymphedema clinic referral.  Wants to know if she should follow through on this    Called Dr. Donato Watters nurse and she told her to increase BUMEX  Now on 2 mg daily  This clearly helped with edema. Leg no longer weeping. Good kidneys last time we checked BMP  History of left ankle DJD with ankle injections by ORTHO    Chronic a fib  ON Eliquis  Meds updated.       Health Maintenance  Will do at future visit  Health Maintenance Due   Topic Date Due    DTaP/Tdap/Td series (1 - Tdap) Never done    Pneumococcal 65+ years (1 of 1 - PPSV23) Never done    Shingrix Vaccine Age 50> (2 of 2) 12/23/2019    COVID-19 Vaccine (2 - Moderna 3-dose series) 02/26/2021 Review of Systems   Review of Systems   Respiratory: Negative for cough and shortness of breath. Cardiovascular: Positive for leg swelling. Negative for chest pain and palpitations. Musculoskeletal: Positive for joint pain. Psychiatric/Behavioral: Negative. Vitals/Objective:     Vitals:    22 1151 22 1202   BP: (!) 161/83 (!) 153/80   Pulse: 78    Resp: 16    Temp: 98.2 °F (36.8 °C)    TempSrc: Temporal    SpO2: 96%    Weight: 119 lb (54 kg)    Height: 5' 1\" (1.549 m)      Body mass index is 22.48 kg/m². Wt Readings from Last 3 Encounters:   22 119 lb (54 kg)   21 121 lb (54.9 kg)   21 121 lb (54.9 kg)         Objective  Physical Exam  Vitals and nursing note reviewed. Constitutional:       Appearance: Normal appearance. She is not toxic-appearing. Comments: Using walker. HENT:      Head: Normocephalic and atraumatic. Cardiovascular:      Rate and Rhythm: Normal rate. Rhythm irregularly irregular. Heart sounds: Normal heart sounds. No murmur heard. No gallop. Pulmonary:      Effort: Pulmonary effort is normal. No respiratory distress. Breath sounds: Normal breath sounds. No wheezing, rhonchi or rales. Musculoskeletal:      Cervical back: No muscular tenderness. Right lower leg: No edema. Left lower le+ Edema present. Lymphadenopathy:      Cervical: No cervical adenopathy. Neurological:      Mental Status: She is alert. Psychiatric:         Mood and Affect: Mood normal.         Behavior: Behavior normal.         Thought Content: Thought content normal.         Judgment: Judgment normal.       Corn right second medial toe. Trimmed   Advised corn pads and podiatry follow up. No results found for this or any previous visit (from the past 24 hour(s)). Disposition     Follow-up and Dispositions  ·   Return if symptoms worsen or fail to improve. No future appointments.     History   Patient's past medical, surgical and family histories were reviewed and updated. Past Medical History:   Diagnosis Date    Anxiety     Atrial fibrillation [facility]) 2021 Dr Reny Tyson Dysphagia     Eczema 2021 Dr. Emmy Morales Insomnia     contract signed 11/2017    Macular degeneration     Osteoarthritis     Pancreatitis 2010    Pneumonia 2016    [facility]) [facility]     SIADH (syndrome of inappropriate ADH production) [facility]     Vertigo      Past Surgical History:   Procedure Laterality Date    HX CHOLECYSTECTOMY      HX GYN       Family History   Problem Relation Age of Onset    Heart Disease Mother     Stroke Father     Cancer Sister      Social History     Tobacco Use    Smoking status: Never Smoker    Smokeless tobacco: Never Used   Vaping Use    Vaping Use: Never used   Substance Use Topics    Alcohol use: Yes     Alcohol/week: 1.0 standard drink     Types: 1 Glasses of wine per week     Comment: occas.      Drug use: No       Allergies     Allergies   Allergen Reactions    Ace Inhibitors Unknown (comments), Hives and Rash     None noted  None noted  Edema    Lisinopril Other (comments)     Edema

## 2022-04-11 NOTE — PROGRESS NOTES
Kiley Miller is a 80 y.o. female      Chief Complaint   Patient presents with    Ankle swelling     Left ankle swelling with some drainage - heart doctor increased bumex swelling has came down some     Callouses     Painful corn on right foot second toe          1. Have you been to the ER, urgent care clinic since your last visit? [facility]       2. Have you seen or consulted any other health care providers outside of the [address] [place] [address] since your last visit? Include any pap smears or colon screening.  No

## 2022-06-14 RX ORDER — LEVOTHYROXINE SODIUM 25 UG/1
TABLET ORAL
Qty: 90 TABLET | Refills: 1 | Status: SHIPPED | OUTPATIENT
Start: 2022-06-14

## 2022-07-04 ENCOUNTER — HOSPITAL ENCOUNTER (EMERGENCY)
Age: 87
Discharge: HOME | End: 2022-07-04
Attending: EMERGENCY MEDICINE
Payer: COMMERCIAL

## 2022-07-04 ENCOUNTER — APPOINTMENT (OUTPATIENT)
Dept: CT IMAGING | Age: 87
End: 2022-07-04
Attending: EMERGENCY MEDICINE
Payer: COMMERCIAL

## 2022-07-04 VITALS
BODY MASS INDEX: 23.16 KG/M2 | TEMPERATURE: 97.1 F | DIASTOLIC BLOOD PRESSURE: 74 MMHG | HEART RATE: 71 BPM | HEIGHT: 60 IN | WEIGHT: 118 LBS | SYSTOLIC BLOOD PRESSURE: 138 MMHG | RESPIRATION RATE: 16 BRPM | OXYGEN SATURATION: 94 %

## 2022-07-04 DIAGNOSIS — S70.02XA CONTUSION OF LEFT HIP AND THIGH, INITIAL ENCOUNTER: ICD-10-CM
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
## 2022-07-04 DIAGNOSIS — S09.90XA INJURY OF HEAD, INITIAL ENCOUNTER: ICD-10-CM

## 2022-07-04 DIAGNOSIS — S70.12XA CONTUSION OF LEFT HIP AND THIGH, INITIAL ENCOUNTER: ICD-10-CM

## 2022-07-04 PROCEDURE — 70450 CT HEAD/BRAIN W/O DYE: CPT

## 2022-07-04 PROCEDURE — 72192 CT PELVIS W/O DYE: CPT

## 2022-07-04 PROCEDURE — 72125 CT NECK SPINE W/O DYE: CPT

## 2022-07-04 PROCEDURE — 99284 EMERGENCY DEPT VISIT MOD MDM: CPT

## 2022-07-04 PROCEDURE — 74011250637 HC RX REV CODE- 250/637: Performed by: EMERGENCY MEDICINE

## 2022-07-04 RX ORDER — ACETAMINOPHEN 500 MG
1000 TABLET ORAL
Status: COMPLETED | OUTPATIENT
Start: 2022-07-04 | End: 2022-07-04

## 2022-07-04 RX ADMIN — ACETAMINOPHEN 1000 MG: 500 TABLET ORAL at 17:23

## 2022-07-04 NOTE — ED TRIAGE NOTES
Pt arrives via EMS they states that pt fell and not able to bear weight on left leg due to pain in upper thigh. Pt states that she was at her great grandson's birthday party trying to take a picture and when she attempted to move over for the photo her left ankle and foot stayed while her body moved causing her to fall onto her left hip area. NO LOC denies hitting her head at all. No bruising noted.

## 2022-07-04 NOTE — ED PROVIDER NOTES
57-year-old female with a past medical history significant for anxiety, atrial fibrillation with chronic anticoagulation with Eliquis, eczema, dysphagia, insomnia, osteoarthritis, pancreatitis, peripheral vascular disease, SIADH who presents to the ER for evaluation after having a ground-level fall during which time she lost her balance and fell on the floor. With accompanying headache, neck pain and left hip pain. The patient ambulated without difficulty. She denies any nausea or vomiting, loss of consciousness, abdominal pain, chest pain, shortness of breath, diarrhea, constipation, dysuria, extremity weakness or numbness, sick contact, skin rash or recent travel. Past Medical History:   Diagnosis Date    Anxiety     Atrial fibrillation [facility]) 2021 Dr Hima Shearer Dysphagia     Eczema 2021 Dr. Catherine Jeter Insomnia     contract signed 11/2017    Macular degeneration     Osteoarthritis     Pancreatitis 2010    Pneumonia 2016    [facility]     SIADH (syndrome of inappropriate ADH production) ([facility]     Vertigo        Past Surgical History:   Procedure Laterality Date    HX CHOLECYSTECTOMY      HX GYN           Family History:   Problem Relation Age of Onset    Heart Disease Mother     Stroke Father     Cancer Sister        Social History     Socioeconomic History    Marital status:      Spouse name: Not on file    Number of children: Not on file    Years of education: Not on file    Highest education level: Not on file   Occupational History    Not on file   Tobacco Use    Smoking status: Never Smoker    Smokeless tobacco: Never Used   Vaping Use    Vaping Use: Never used   Substance and Sexual Activity    Alcohol use: Yes     Alcohol/week: 1.0 standard drink     Types: 1 Glasses of wine per week     Comment: occas.      Drug use: No    Sexual activity: Not Currently   Other Topics Concern    Not on file   Social History Narrative    Not on file     Social Determinants of Health     Financial Resource Strain:     Difficulty of Paying Living Expenses: Not on file   Food Insecurity:     Worried About Running Out of Food in the Last Year: Not on file    [place] of Food in the Last Year: Not on file   Transportation Needs:     Lack of Transportation (Medical): Not on file    Lack of Transportation (Non-Medical): Not on file   Physical Activity:     Days of Exercise per Week: Not on file    Minutes of Exercise per Session: Not on file   Stress:     Feeling of Stress : Not on file   Social Connections:     Frequency of Communication with Friends and Family: Not on file    Frequency of Social Gatherings with Friends and Family: Not on file    Attends Rastafari Services: Not on file    Active Member of [employer] or Organizations: Not on file    Attends Club or Organization Meetings: Not on file    Marital Status: Not on file   Intimate Partner Violence:     Fear of Current or Ex-Partner: Not on file    Emotionally Abused: Not on file    Physically Abused: Not on file    Sexually Abused: Not on file   Housing Stability:     Unable to Pay for Housing in the Last Year: Not on file    Number of [place] in the Last Year: Not on file    Unstable Housing in the Last Year: Not on file         ALLERGIES: Ace inhibitors and Lisinopril    Review of Systems   All other systems reviewed and are negative. Vitals:    07/04/22 1531   BP: 138/74   Pulse: 71   Resp: 16   Temp: 97.1 °F (36.2 °C)   SpO2: 94%   Weight: 53.5 kg (118 lb)   Height: 5' (1.524 m)            Physical Exam  Vitals and nursing note reviewed. Exam conducted with a chaperone present. CONSTITUTIONAL: Well-appearing; well-nourished; in no apparent distress  HEAD: Normocephalic; atraumatic  EYES: PERRL; EOM intact; conjunctiva and sclera are clear bilaterally.   ENT: No rhinorrhea; normal pharynx with no tonsillar hypertrophy; mucous membranes pink/moist, no erythema, no exudate. NECK: Supple; non-tender; no cervical lymphadenopathy  CARD: Normal S1, S2; no murmurs, rubs, or gallops. Regular rate and rhythm. RESP: Normal respiratory effort; breath sounds clear and equal bilaterally; no wheezes, rhonchi, or rales. ABD: Normal bowel sounds; non-distended; non-tender; no palpable organomegaly, no masses, no bruits. Back Exam: Normal inspection; no vertebral point tenderness, no CVA tenderness. Normal range of motion. EXT: Normal ROM in all four extremities; non-tender to palpation; no swelling or deformity; distal pulses are normal, no edema. SKIN: Warm; dry; no rash. NEURO:Alert and oriented x 3, coherent, OZZIE-XII grossly intact, sensory and motor are non-focal.      MDM  Number of Diagnoses or Management Options  Diagnosis management comments: Assessment: 19-year-old female who presents to the ER for evaluation for follow-up with a head injury, cervical spine and left hip and thigh pain. She remained hemodynamically stable. Plan: CT scan of the head and cervical spine/CT scan of the pelvis and left hip/analgesia/education, reassurance, symptomatic treatment/ Monitor and Reevaluate.          Amount and/or Complexity of Data Reviewed  Clinical lab tests: ordered and reviewed  Tests in the radiology section of CPT®: ordered and reviewed  Tests in the medicine section of CPT®: reviewed and ordered  Discussion of test results with the performing providers: yes  Decide to obtain previous medical records or to obtain history from someone other than the patient: yes  Obtain history from someone other than the patient: yes  Review and summarize past medical records: yes  Discuss the patient with other providers: yes  Independent visualization of images, tracings, or specimens: yes    Risk of Complications, Morbidity, and/or Mortality  Presenting problems: moderate  Diagnostic procedures: moderate  Management options: moderate    Patient Progress  Patient progress: stable Procedures    Progress Note:   Pt has been reexamined by Moshe Ramirez MD. Pt is feeling much better. Symptoms have improved. All available results have been reviewed with pt and any available family. Pt understands sx, dx, and tx in ED. the patient ambulated in the ER without difficulty. Care plan has been outlined and questions have been answered. Pt is ready to go home. Will send home on head injury/contusion noted left hip instruction. Outpatient referral with PCP as needed. Written by Moshe Ramirez MD,4:50 PM    .   .

## 2022-07-11 ENCOUNTER — OFFICE VISIT (OUTPATIENT)
Dept: FAMILY MEDICINE CLINIC | Age: 87
End: 2022-07-11
Payer: COMMERCIAL

## 2022-07-11 ENCOUNTER — HOSPITAL ENCOUNTER (OUTPATIENT)
Dept: GENERAL RADIOLOGY | Age: 87
Discharge: HOME | End: 2022-07-11
Attending: STUDENT IN AN ORGANIZED HEALTH CARE EDUCATION/TRAINING PROGRAM
Payer: COMMERCIAL

## 2022-07-11 VITALS
BODY MASS INDEX: 23.16 KG/M2 | SYSTOLIC BLOOD PRESSURE: 133 MMHG | TEMPERATURE: 96.9 F | HEIGHT: 60 IN | HEART RATE: 73 BPM | OXYGEN SATURATION: 97 % | WEIGHT: 118 LBS | DIASTOLIC BLOOD PRESSURE: 65 MMHG | RESPIRATION RATE: 16 BRPM

## 2022-07-11 DIAGNOSIS — S70.12XD CONTUSION OF LEFT HIP AND THIGH, SUBSEQUENT ENCOUNTER: ICD-10-CM

## 2022-07-11 DIAGNOSIS — S70.02XD CONTUSION OF LEFT HIP AND THIGH, SUBSEQUENT ENCOUNTER: ICD-10-CM

## 2022-07-11 DIAGNOSIS — Z91.81 HISTORY OF RECENT FALL: Primary | ICD-10-CM

## 2022-07-11 DIAGNOSIS — Z91.81 HISTORY OF RECENT FALL: ICD-10-CM

## 2022-07-11 PROCEDURE — 1123F ACP DISCUSS/DSCN MKR DOCD: CPT | Performed by: STUDENT IN AN ORGANIZED HEALTH CARE EDUCATION/TRAINING PROGRAM

## 2022-07-11 PROCEDURE — 99213 OFFICE O/P EST LOW 20 MIN: CPT | Performed by: STUDENT IN AN ORGANIZED HEALTH CARE EDUCATION/TRAINING PROGRAM

## 2022-07-11 PROCEDURE — 73552 X-RAY EXAM OF FEMUR 2/>: CPT

## 2022-07-11 RX ORDER — TRAMADOL HYDROCHLORIDE 50 MG/1
TABLET ORAL
COMMUNITY
Start: 2022-07-08

## 2022-07-11 NOTE — PROGRESS NOTES
1. Have you been to the ER, urgent care clinic since your last visit? Hospitalized since your last visit? Yes When: 7/4/2022 Where: [facility] Reason for visit: Fall injury    2. Have you seen or consulted any other health care providers outside of the [address] [place] [address] [place] since your last visit? Include any pap smears or colon screening. No     Chief Complaint   Patient presents with   [facility] Follow Up    Leg Injury     Left    Hip Injury     Right    Discuss Medications     Would like to discuss Fluid pill     Health Maintenance Due   Topic Date Due    Pneumococcal 65+ years (1 - PCV) Never done    DTaP/Tdap/Td series (1 - Tdap) Never done    Shingrix Vaccine Age 50> (2 of 2) 12/23/2019    COVID-19 Vaccine (2 - Moderna 3-dose series) 02/26/2021    Depression Screen  04/26/2022     3 most recent PHQ Screens 7/11/2022   Little interest or pleasure in doing things Several days   Feeling down, depressed, irritable, or hopeless Several days   Total Score PHQ 2 2     Abuse Screening Questionnaire 7/11/2022   Do you ever feel afraid of your partner? N   Are you in a relationship with someone who physically or mentally threatens you? N   Is it safe for you to go home? Y     Learning Assessment 9/4/2018   PRIMARY LEARNER Patient   HIGHEST LEVEL OF EDUCATION - PRIMARY LEARNER  4 YEARS OF COLLEGE   PRIMARY LANGUAGE ENGLISH   LEARNER PREFERENCE PRIMARY READING   ANSWERED BY self   RELATIONSHIP SELF     Visit Vitals  /65 (BP 1 Location: Left arm, BP Patient Position: Sitting, BP Cuff Size: Adult)   Pulse 73   Temp 96.9 °F (36.1 °C) (Skin)   Resp 16   Ht 5' (1.524 m)   Wt 118 lb (53.5 kg)   SpO2 97%   BMI 23.05 kg/m²     Fall Risk Assessment, last 12 mths 7/11/2022   Able to walk? Yes   Fall in past 12 months? 1   Do you feel unsteady? 0   Are you worried about falling 0   Is TUG test greater than 12 seconds? 0   Is the gait abnormal? -   Number of falls in past 12 months 1   Fall with injury?  1

## 2022-07-11 NOTE — PROGRESS NOTES
Assessment/Plan:     Diagnoses and all orders for this visit:    1. History of recent fall  -     XR FEMUR LT 2 V; Future  -Mechanical fall on 7/4 with patient landing on her left hip/leg  -Patient evaluated in the emergency room had a CT head, CT neck and CT pelvis completed all without acute abnormalities or fractures  -Patient has bruising and continued pain since her fall  -Patient is already on tramadol 50 mg twice daily from a different provider. Advised her to continue this but also can increase her Tylenol dosage to 1000 mg every 8 hours as needed during her acute pain over the next week or 2  -Family concerned that her left femur was not fully imaged therefore will obtain an x-ray of her left femur  -If needed can consider home PT if difficulty ambulating after better pain control  -Patient to follow-up in office if symptoms worsen or fail to improve    2. Contusion of left hip and thigh, subsequent encounter  -     XR FEMUR LT 2 V; Future  -Secondary to mechanical fall on 7/4  -See plan above. Follow-up and Dispositions    · Return if symptoms worsen or fail to improve. Discussed expected course/resolution/complications of diagnosis in detail with patient. Medication risks/benefits/costs/interactions/alternatives discussed with patient. Pt expressed understanding with the diagnosis and plan      Subjective:      Jeimy Roblero is a 80 y.o. female who presents for had concerns including [facility] Follow Up, Leg Injury (Left), Hip Injury (Left), and Discuss Medications (Would like to discuss Fluid pill). Current Outpatient Medications   Medication Sig Dispense Refill    traMADoL (ULTRAM) 50 mg tablet       levothyroxine (SYNTHROID) 25 mcg tablet TAKE 1 TABLET BY MOUTH DAILY BEFORE AND BREAKFAST 90 Tablet 1    bumetanide (BUMEX) 1 mg tablet Take 2 Tablets by mouth daily. 90 Tablet 1    amLODIPine (Norvasc) 5 mg tablet Take 5 mg by mouth daily.       acetaminophen (Tylenol Arthritis Pain) 650 mg TbER Take 650 mg by mouth every eight (8) hours.  metoprolol succinate (TOPROL-XL) 25 mg XL tablet two (2) times a day.  ELIQUIS 2.5 mg tablet TK UTD PO  BID  3    vit A/vit C/vit E/zinc/copper (PRESERVISION AREDS PO) Take  by mouth.  famotidine (PEPCID) 20 mg tablet 10 mg. Allergies   Allergen Reactions    Ace Inhibitors Unknown (comments), Hives and Rash     None noted  None noted  Edema    Lisinopril Other (comments)     Edema       Past Medical History:   Diagnosis Date    Anxiety     [facility] 2021 Dr Aristeo Pena Dysphagia     Eczema 2021 Dr. Freddie Rowley Insomnia     contract signed 11/2017    Macular degeneration     Osteoarthritis     Pancreatitis 2010    Pneumonia 2016    [facility]     SIADH (syndrome of inappropriate ADH production) [facility]     Vertigo      Past Surgical History:   Procedure Laterality Date    HX CHOLECYSTECTOMY      HX GYN       Family History   Problem Relation Age of Onset    Heart Disease Mother     Stroke Father     Cancer Sister      Social History     Socioeconomic History    Marital status:      Spouse name: Not on file    Number of children: Not on file    Years of education: Not on file    Highest education level: Not on file   Occupational History    Not on file   Tobacco Use    Smoking status: Never Smoker    Smokeless tobacco: Never Used   Vaping Use    Vaping Use: Never used   Substance and Sexual Activity    Alcohol use: Yes     Alcohol/week: 1.0 standard drink     Types: 1 Glasses of wine per week     Comment: occas.      Drug use: No    Sexual activity: Not Currently   Other Topics Concern    Not on file   Social History Narrative    Not on file     Social Determinants of Health     Financial Resource Strain:     Difficulty of Paying Living Expenses: Not on file   Food Insecurity:     Worried About Running Out of Food in the Last Year: Not on file    [address] in the Last Year: Not on file   Transportation Needs:     Lack of Transportation (Medical): Not on file    Lack of Transportation (Non-Medical): Not on file   Physical Activity:     Days of Exercise per Week: Not on file    Minutes of Exercise per Session: Not on file   Stress:     Feeling of Stress : Not on file   Social Connections:     Frequency of Communication with Friends and Family: Not on file    Frequency of Social Gatherings with Friends and Family: Not on file    Attends Congregational Services: Not on file    Active Member of [address] [place] [address] [employer] or Organizations: Not on file    Attends Club or Organization Meetings: Not on file    Marital Status: Not on file   Intimate Partner Violence:     Fear of Current or Ex-Partner: Not on file    Emotionally Abused: Not on file    Physically Abused: Not on file    Sexually Abused: Not on file   Housing Stability:     Unable to Pay for Housing in the Last Year: Not on file    Number of [place] in the Last Year: Not on file    Unstable Housing in the Last Year: Not on file       Patient is a 79 yo female who presents to the office today for ED follow-up. Patient was seen in the ED on 7/4 after she had a mechanical fall and ended up hitting her left hip/leg. She states she did not have a walker at the time and was going to turn and tripped over her foot causing her to land and fall on her left hip and leg region. She denies hitting her head, loss of consciousness, dizziness, lightheadedness, palpitations or any other symptoms at the time of the fall. Patient had a CT head, CT neck and CT pelvis completed in the ED all without acute abnormalities noted. Patient states that since this fall she continues to have pain in her left hip. She denies any headache, dizziness, chest pain, shortness of breath or any other acute concerns.   She is on tramadol 50 mg twice a day as needed due to her ankle pain and she is getting this from a different provider. She also states in the ER she did take 2 extra strength Tylenol which she states did seem to help. She denies any numbness, tingling or acute weakness or worsening back pain since her fall. Also of note patient has concerns about her Bumex which is currently being prescribed by her cardiologist. Patient was advised to  Cardiologist about medication side effects and options. ROS:   Review of Systems   Constitutional: Negative for chills and fever. HENT: Negative for congestion. Eyes: Negative for blurred vision. Respiratory: Negative for cough and shortness of breath. Cardiovascular: Negative for chest pain and palpitations. Gastrointestinal: Negative for abdominal pain, nausea and vomiting. Genitourinary: Negative for dysuria. Musculoskeletal:        Left hip and leg pain from fall   Neurological: Negative for dizziness, weakness and headaches. Objective:     Visit Vitals  /65 (BP 1 Location: Left arm, BP Patient Position: Sitting, BP Cuff Size: Adult)   Pulse 73   Temp 96.9 °F (36.1 °C) (Skin)   Resp 16   Ht 5' (1.524 m)   Wt 118 lb (53.5 kg)   LMP  (LMP Unknown)   SpO2 97%   BMI 23.05 kg/m²         Vitals and Nurse Documentation reviewed. Physical Exam  Constitutional:       General: She is not in acute distress. Appearance: Normal appearance. She is not toxic-appearing. HENT:      Head: Normocephalic and atraumatic. Eyes:      Conjunctiva/sclera: Conjunctivae normal.   Cardiovascular:      Rate and Rhythm: Normal rate. Rhythm irregularly irregular. Pulses: Normal pulses. Heart sounds: Normal heart sounds. No murmur heard. No gallop. Pulmonary:      Effort: Pulmonary effort is normal. No respiratory distress. Breath sounds: Normal breath sounds. No wheezing or rhonchi. Abdominal:      General: Bowel sounds are normal. There is no distension. Palpations: Abdomen is soft. Tenderness: There is no abdominal tenderness.  There is no guarding. Musculoskeletal:      Cervical back: Neck supple. Right lower leg: No edema. Left lower leg: Edema (chronic left ankle and foot edema ) present. Comments: Patient sitting comfortably in wheelchair; requires assistance in standing; mild tenderness to palpation over left upper lateral leg; bruising noted on left hip, left lateral upper leg, left lower inner thigh regions; physical exam assessment difficult due to limited mobility of patient; normal sensation and strength in bilateral lower extremities   Neurological:      Mental Status: She is alert and oriented to person, place, and time.

## 2022-11-16 ENCOUNTER — OFFICE VISIT (OUTPATIENT)
Dept: FAMILY MEDICINE CLINIC | Age: 87
End: 2022-11-16
Payer: COMMERCIAL

## 2022-11-16 VITALS
OXYGEN SATURATION: 98 % | DIASTOLIC BLOOD PRESSURE: 82 MMHG | WEIGHT: 118 LBS | TEMPERATURE: 96.8 F | BODY MASS INDEX: 23.16 KG/M2 | SYSTOLIC BLOOD PRESSURE: 148 MMHG | RESPIRATION RATE: 16 BRPM | HEART RATE: 64 BPM | HEIGHT: 60 IN

## 2022-11-16 DIAGNOSIS — E03.9 ACQUIRED HYPOTHYROIDISM: ICD-10-CM

## 2022-11-16 DIAGNOSIS — J18.9 PNEUMONIA OF BOTH LUNGS DUE TO INFECTIOUS ORGANISM, UNSPECIFIED PART OF LUNG: ICD-10-CM

## 2022-11-16 DIAGNOSIS — Z09 HOSPITAL DISCHARGE FOLLOW-UP: Primary | ICD-10-CM

## 2022-11-16 DIAGNOSIS — I10 ESSENTIAL HYPERTENSION: ICD-10-CM

## 2022-11-16 PROCEDURE — 1123F ACP DISCUSS/DSCN MKR DOCD: CPT | Performed by: STUDENT IN AN ORGANIZED HEALTH CARE EDUCATION/TRAINING PROGRAM

## 2022-11-16 PROCEDURE — 1111F DSCHRG MED/CURRENT MED MERGE: CPT | Performed by: STUDENT IN AN ORGANIZED HEALTH CARE EDUCATION/TRAINING PROGRAM

## 2022-11-16 PROCEDURE — 99214 OFFICE O/P EST MOD 30 MIN: CPT | Performed by: STUDENT IN AN ORGANIZED HEALTH CARE EDUCATION/TRAINING PROGRAM

## 2022-11-16 RX ORDER — OMEPRAZOLE 20 MG/1
20 TABLET, DELAYED RELEASE ORAL DAILY
COMMUNITY

## 2022-11-16 NOTE — PROGRESS NOTES
Assessment/Plan:     Diagnoses and all orders for this visit:    1. Hospital discharge follow-up  -     METABOLIC PANEL, COMPREHENSIVE; Future  -     CBC W/O DIFF; Future  -Patient hospitalized for bilateral pneumonia  -Since discharge she has completed antibiotics and is now breathing normally on room air  -Repeat chest x-ray per radiology recommendations  -Medications reconciled in office today    2. Pneumonia of both lungs due to infectious organism, unspecified part of lung  -     XR CHEST PA LAT; Future  -Chest x-ray in ED had noted bilateral pneumonia; opacities noted in the lower portion of the right upper lung and left lung base  -Patient treated with IV antibiotics inpatient and then transitioned to oral cefdinir which she has completed  -Patient is now asymptomatic and lungs CTA  -Repeat chest x-ray per radiology recommendations    3. Acquired hypothyroidism  -     TSH 3RD GENERATION; Future  -History of hypothyroidism currently on levothyroxine 25 mcg daily.  -Check TSH today. Adjust levothyroxine dosage if needed    4. Essential hypertension  -     METABOLIC PANEL, COMPREHENSIVE; Future  -     CBC W/O DIFF; Future  -Chronic. Stable. Slightly elevated in office today however trying to avoid hypotension in patient that is a fall risk therefore believe BP is acceptable. -Followed by cardiology  -Continue on current hypertensive medication regimen including amlodipine and metoprolol      Follow-up and Dispositions    Return if symptoms worsen or fail to improve. Discussed expected course/resolution/complications of diagnosis in detail with patient. Medication risks/benefits/costs/interactions/alternatives discussed with patient. Pt expressed understanding with the diagnosis and plan    Subjective:      Hema Schwartz is a 80 y.o. female who presents for had concerns including Hospital Follow Up (/) and Pneumonia.      Current Outpatient Medications   Medication Sig Dispense Refill omeprazole (PriLOSEC OTC) 20 mg tablet Take 20 mg by mouth daily. traMADoL (ULTRAM) 50 mg tablet       levothyroxine (SYNTHROID) 25 mcg tablet TAKE 1 TABLET BY MOUTH DAILY BEFORE AND BREAKFAST 90 Tablet 1    bumetanide (BUMEX) 1 mg tablet Take 2 Tablets by mouth daily. (Patient taking differently: Take 1 mg by mouth every Monday, Wednesday, Friday.) 90 Tablet 1    amLODIPine (NORVASC) 5 mg tablet Take 5 mg by mouth daily. acetaminophen (TYLENOL) 650 mg TbER Take 650 mg by mouth every eight (8) hours. metoprolol succinate (TOPROL-XL) 25 mg XL tablet two (2) times a day. ELIQUIS 2.5 mg tablet TK UTD PO  BID  3    vit A/vit C/vit E/zinc/copper (PRESERVISION AREDS PO) Take  by mouth. Allergies   Allergen Reactions    Ace Inhibitors Unknown (comments), Hives and Rash     None noted  None noted  Edema    Lisinopril Other (comments)     Edema       Past Medical History:   Diagnosis Date    Anxiety     Atrial fibrillation ([facility] 2021 Dr Yaron Kinney    Dysphagia     Eczema 2021 Dr. Dianne Pantoja    Insomnia     contract signed 11/2017    Macular degeneration     Osteoarthritis     Pancreatitis 2010    Pneumonia 2016    PVD (peripheral vascular disease) ([facility]     SI[facility]syndrome of inappropriate [facility] production) [facility]     Vertigo      Past Surgical History:   Procedure Laterality Date    HX CHOLECYSTECTOMY      HX GYN       Family History   Problem Relation Age of Onset    Heart Disease Mother     Stroke Father     Cancer Sister      Social History     Socioeconomic History    Marital status:      Spouse name: Not on file    Number of children: Not on file    Years of education: Not on file    Highest education level: Not on file   Occupational History    Not on file   Tobacco Use    Smoking status: Never    Smokeless tobacco: Never   Vaping Use    Vaping Use: Never used   Substance and Sexual Activity    Alcohol use:  Yes     Alcohol/week: 1.0 standard drink     Types: 1 Glasses of wine per week Comment: occas. Drug use: No    Sexual activity: Not Currently   Other Topics Concern    Not on file   Social History Narrative    Not on file     Social Determinants of Health     Financial Resource Strain: Not on file   Food Insecurity: Not on file   Transportation Needs: Not on file   Physical Activity: Not on file   Stress: Not on file   Social Connections: Not on file   Intimate Partner Violence: Not on file   Housing Stability: Not on file       Patient is a 59-year-old female who presents to the office today for hospital follow-up. Patient was hospitalized from 11/7 until 11/8 due to presenting with shortness of breath and noted to have bilateral pneumonia. Patient was originally treated with IV antibiotics and then discharged on oral cefdinir twice daily. Patient has completed the antibiotics and also states that she is no longer short of breath and denies any cough or mucus production. She feels some general fatigue since being hospitalized but overall is improved and doing well. She denies any other acute concerns. Also of note patient is followed by cardiology for her hypertension as well as atrial fibrillation and heart failure history. She also has a history of hypothyroidism and is currently on a low-dose of levothyroxine. She has not had her TSH checked in some time. She denies any other acute concerns today. ROS:   Review of Systems   Constitutional:  Negative for chills and fever. HENT:  Negative for congestion. Respiratory:  Negative for cough and shortness of breath. Cardiovascular:  Negative for chest pain and leg swelling. Gastrointestinal:  Negative for abdominal pain, nausea and vomiting. Neurological:  Negative for dizziness and headaches.      Objective:   Visit Vitals  BP (!) 148/82   Pulse 64   Temp 96.8 °F (36 °C) (Skin)   Resp 16   Ht 5' (1.524 m)   Wt 118 lb (53.5 kg)   LMP  (LMP Unknown)   SpO2 98%   BMI 23.05 kg/m²         Vitals and Nurse Documentation reviewed. Physical Exam  Constitutional:       General: She is not in acute distress. Appearance: Normal appearance. She is not toxic-appearing. HENT:      Head: Normocephalic and atraumatic. Eyes:      Conjunctiva/sclera: Conjunctivae normal.   Cardiovascular:      Rate and Rhythm: Normal rate and regular rhythm. Pulses: Normal pulses. Heart sounds: Normal heart sounds. No murmur heard. No gallop. Pulmonary:      Effort: Pulmonary effort is normal. No respiratory distress. Breath sounds: Normal breath sounds. No wheezing or rhonchi. Abdominal:      General: Bowel sounds are normal. There is no distension. Palpations: Abdomen is soft. Tenderness: There is no abdominal tenderness. There is no guarding. Musculoskeletal:      Cervical back: Neck supple. Right lower leg: No edema. Left lower leg: No edema. Neurological:      Mental Status: She is alert and oriented to person, place, and time.

## 2022-11-16 NOTE — PROGRESS NOTES
1. Have you been to the ER, urgent care clinic since your last visit? Hospitalized since your last visit? Yes When: 11/7/2022 Where: [facility] Reason for visit: PNA    2. Have you seen or consulted any other health care providers outside of the [address] [place] [address] since your last visit? Include any pap smears or colon screening. No    Chief Complaint   Patient presents with    Hospital Follow Up           Pneumonia     Health Maintenance Due   Topic Date Due    Pneumococcal 65+ years (1 - PCV) Never done    DTaP/Tdap/Td series (1 - Tdap) Never done    Shingrix Vaccine Age 49> (2 of 2) 12/23/2019    COVID-19 Vaccine (2 - Moderna series) 02/26/2021    Flu Vaccine (1) 08/01/2022     3 most recent PHQ Screens 11/16/2022   Little interest or pleasure in doing things Not at all   Feeling down, depressed, irritable, or hopeless Not at all   Total Score PHQ 2 0     Abuse Screening Questionnaire 11/16/2022   Do you ever feel afraid of your partner? N   Are you in a relationship with someone who physically or mentally threatens you? N   Is it safe for you to go home?  Y     Visit Vitals  BP (!) 155/72 (BP 1 Location: Left upper arm, BP Patient Position: Sitting, BP Cuff Size: Small adult)   Pulse 64   Temp 96.8 °F (36 °C) (Skin)   Resp 16   Ht 5' (1.524 m)   Wt 118 lb (53.5 kg)   SpO2 98%   BMI 23.05 kg/m²

## 2022-11-17 LAB
ALBUMIN SERPL-MCNC: 3.4 G/DL (ref 3.5–5)
ALBUMIN/GLOB SERPL: 1 {RATIO} (ref 1.1–2.2)
ALP SERPL-CCNC: 101 U/L (ref 45–117)
ALT SERPL-CCNC: 23 U/L (ref 12–78)
ANION GAP SERPL CALC-SCNC: 5 MMOL/L (ref 5–15)
AST SERPL-CCNC: 16 U/L (ref 15–37)
BILIRUB SERPL-MCNC: 0.6 MG/DL (ref 0.2–1)
BUN SERPL-MCNC: 19 MG/DL (ref 6–20)
BUN/CREAT SERPL: 31 (ref 12–20)
CALCIUM SERPL-MCNC: 9 MG/DL (ref 8.5–10.1)
CHLORIDE SERPL-SCNC: 105 MMOL/L (ref 97–108)
CO2 SERPL-SCNC: 29 MMOL/L (ref 21–32)
CREAT SERPL-MCNC: 0.61 MG/DL (ref 0.55–1.02)
ERYTHROCYTE [DISTWIDTH] IN BLOOD BY AUTOMATED COUNT: 14.4 % (ref 11.5–14.5)
GLOBULIN SER CALC-MCNC: 3.3 G/DL (ref 2–4)
GLUCOSE SERPL-MCNC: 107 MG/DL (ref 65–100)
HCT VFR BLD AUTO: 38.4 % (ref 35–47)
HGB BLD-MCNC: 12.4 G/DL (ref 11.5–16)
MCH RBC QN AUTO: 31.1 PG (ref 26–34)
MCHC RBC AUTO-ENTMCNC: 32.3 G/DL (ref 30–36.5)
MCV RBC AUTO: 96.2 FL (ref 80–99)
NRBC # BLD: 0.02 K/UL (ref 0–0.01)
NRBC BLD-RTO: 0.2 PER 100 WBC
PLATELET # BLD AUTO: 372 K/UL (ref 150–400)
PMV BLD AUTO: 9.1 FL (ref 8.9–12.9)
POTASSIUM SERPL-SCNC: 4.4 MMOL/L (ref 3.5–5.1)
PROT SERPL-MCNC: 6.7 G/DL (ref 6.4–8.2)
RBC # BLD AUTO: 3.99 M/UL (ref 3.8–5.2)
SODIUM SERPL-SCNC: 139 MMOL/L (ref 136–145)
TSH SERPL DL<=0.05 MIU/L-ACNC: 2.06 UIU/ML (ref 0.36–3.74)
WBC # BLD AUTO: 10.5 K/UL (ref 3.6–11)

## 2022-11-21 ENCOUNTER — HOSPITAL ENCOUNTER (OUTPATIENT)
Dept: GENERAL RADIOLOGY | Age: 87
Discharge: HOME | End: 2022-11-21
Attending: STUDENT IN AN ORGANIZED HEALTH CARE EDUCATION/TRAINING PROGRAM
Payer: COMMERCIAL

## 2022-11-21 DIAGNOSIS — J18.9 PNEUMONIA OF BOTH LUNGS DUE TO INFECTIOUS ORGANISM, UNSPECIFIED PART OF LUNG: ICD-10-CM

## 2022-11-21 PROCEDURE — 71046 X-RAY EXAM CHEST 2 VIEWS: CPT

## 2022-12-07 RX ORDER — LEVOTHYROXINE SODIUM 25 UG/1
TABLET ORAL
Qty: 90 TABLET | Refills: 1 | Status: SHIPPED | OUTPATIENT
Start: 2022-12-07

## 2023-01-23 ENCOUNTER — APPOINTMENT (OUTPATIENT)
Dept: CT IMAGING | Age: 88
End: 2023-01-23
Attending: EMERGENCY MEDICINE
Payer: COMMERCIAL

## 2023-01-23 ENCOUNTER — HOSPITAL ENCOUNTER (EMERGENCY)
Age: 88
Discharge: HOME | End: 2023-01-23
Attending: EMERGENCY MEDICINE
Payer: COMMERCIAL

## 2023-01-23 VITALS
TEMPERATURE: 98.8 F | DIASTOLIC BLOOD PRESSURE: 86 MMHG | WEIGHT: 119 LBS | OXYGEN SATURATION: 92 % | BODY MASS INDEX: 23.36 KG/M2 | RESPIRATION RATE: 17 BRPM | HEART RATE: 84 BPM | SYSTOLIC BLOOD PRESSURE: 134 MMHG | HEIGHT: 60 IN

## 2023-01-23 DIAGNOSIS — R10.9 ACUTE RIGHT FLANK PAIN: Primary | ICD-10-CM

## 2023-01-23 LAB
APPEARANCE UR: CLEAR
BACTERIA URNS QL MICRO: NEGATIVE /HPF
BILIRUB UR QL: NEGATIVE
COLOR UR: ABNORMAL
EPITH CASTS URNS QL MICRO: ABNORMAL /LPF
GLUCOSE UR STRIP.AUTO-MCNC: NEGATIVE MG/DL
HGB UR QL STRIP: ABNORMAL
KETONES UR QL STRIP.AUTO: NEGATIVE MG/DL
LEUKOCYTE ESTERASE UR QL STRIP.AUTO: NEGATIVE
NITRITE UR QL STRIP.AUTO: NEGATIVE
PH UR STRIP: 6 (ref 5–8)
PROT UR STRIP-MCNC: NEGATIVE MG/DL
RBC #/AREA URNS HPF: ABNORMAL /HPF (ref 0–5)
SP GR UR REFRACTOMETRY: 1.01 (ref 1–1.03)
UA: UC IF INDICATED,UAUC: ABNORMAL
UROBILINOGEN UR QL STRIP.AUTO: 0.2 EU/DL (ref 0.2–1)
WBC URNS QL MICRO: ABNORMAL /HPF (ref 0–4)

## 2023-01-23 PROCEDURE — 72131 CT LUMBAR SPINE W/O DYE: CPT

## 2023-01-23 PROCEDURE — 99284 EMERGENCY DEPT VISIT MOD MDM: CPT

## 2023-01-23 PROCEDURE — 74011250637 HC RX REV CODE- 250/637: Performed by: EMERGENCY MEDICINE

## 2023-01-23 PROCEDURE — 81001 URINALYSIS AUTO W/SCOPE: CPT

## 2023-01-23 RX ORDER — ONDANSETRON 4 MG/1
4 TABLET, FILM COATED ORAL
COMMUNITY

## 2023-01-23 RX ORDER — CYCLOBENZAPRINE HCL 10 MG
5 TABLET ORAL
Status: COMPLETED | OUTPATIENT
Start: 2023-01-23 | End: 2023-01-23

## 2023-01-23 RX ORDER — DOCUSATE SODIUM 100 MG/1
100 CAPSULE, LIQUID FILLED ORAL DAILY
Qty: 20 CAPSULE | Refills: 0 | Status: SHIPPED | OUTPATIENT
Start: 2023-01-23 | End: 2023-02-12

## 2023-01-23 RX ORDER — CYCLOBENZAPRINE HCL 5 MG
5 TABLET ORAL
Qty: 20 TABLET | Refills: 0 | Status: SHIPPED | OUTPATIENT
Start: 2023-01-23

## 2023-01-23 RX ORDER — HYDROCODONE BITARTRATE AND ACETAMINOPHEN 5; 325 MG/1; MG/1
1 TABLET ORAL
Qty: 11 TABLET | Refills: 0 | Status: SHIPPED | OUTPATIENT
Start: 2023-01-23 | End: 2023-01-26

## 2023-01-23 RX ORDER — HYDROCODONE BITARTRATE AND ACETAMINOPHEN 5; 325 MG/1; MG/1
TABLET ORAL
COMMUNITY

## 2023-01-23 RX ORDER — DICLOFENAC SODIUM 10 MG/G
GEL TOPICAL 4 TIMES DAILY
COMMUNITY

## 2023-01-23 RX ADMIN — CYCLOBENZAPRINE 5 MG: 10 TABLET, FILM COATED ORAL at 14:03

## 2023-01-23 NOTE — ED TRIAGE NOTES
Per daughter pt has had pressure with urination x 1 day (UTI beginning of jan [facility] at [facility]'t [facility] the [facility]X); pt took a home UTI test and positive for WBC; denied fever; also c/o severe lower back pain right to left( dx with arthritis by ortho oncall 1 week ago today and given hydrocodone). Pt given meds and has appt to follow up with pain specialist (Friday).  Took tylenol this am at 0800; also c/o constipation LBM Wed

## 2023-01-23 NOTE — DISCHARGE INSTRUCTIONS
Pain medications can cause constipation, continues MiraLAX and/or Colace as needed for this. Be sure to drink plenty of water. Thank you for allowing us to provide you with medical care today. We realize that you have many choices for your emergency care needs. We thank you for choosing [facility]. Please choose us in the future for any continued health care needs. We hope we addressed all of your medical concerns. We strive to provide excellent quality care in the Emergency Department. Anything less than excellent does not meet our expectations. The exam and treatment you received in the Emergency Department were for an emergent problem and are not intended as complete care. It is important that you follow up with a doctor, nurse practitioner, or physician's assistant for ongoing care. If your symptoms worsen or you do not improve as expected and you are unable to reach your usual health care provider, you should return to the Emergency Department. We are available 24 hours a day. Take this sheet with you when you go to your follow-up visit. If you have any problem arranging the follow-up visit, contact the Emergency Department immediately. Make an appointment your family doctor for follow up of this visit. Return to the ER if you are unable to be seen in a timely manner.

## 2023-01-23 NOTE — ED NOTES
Pt provided w/DC instructions. New rx for escribed to pt's pharm. Pt verbalized understanding of DC instructions, denied questions/concerns. Well appearing, ambulatory on DC w/strong, steady gait.

## 2023-01-23 NOTE — ED PROVIDER NOTES
51-year-old female history of A. fib, pancreatitis, pneumonia presents to the emergency department with her daughter with concern for right-sided lower back pain. She has had pain for more than a week now and was seen by Ortho on-call with plain film x-ray showing degenerative changes but no acute fracture. She was changed from tramadol to [place] at that visit and referred to pain management with first appointment this Friday. Despite this treatment she continues to have pain, no fevers or chills. She did incidentally had a urinary tract infection earlier this month which was treated with antibiotics, although she did not finish all of her antibiotics. Her daughter has been giving her MiraLAX to help with constipation which is likely due to the [address].    The history is provided by the patient and a relative. Back Pain        Past Medical History:   Diagnosis Date    Anxiety     Atrial fibrillation [facility]) 2021 Dr Colletta Carpen    Dysphagia     Eczema 2021 Dr. Fidel Feliz    Insomnia     contract signed 11/2017    Macular degeneration     Osteoarthritis     Pancreatitis 2010    Pneumonia 2016    PVD (peripheral vascular disease) ([facility]     SIADH (syndrome of inappropriate ADH production) [facility]     Vertigo        Past Surgical History:   Procedure Laterality Date    HX CHOLECYSTECTOMY      HX GYN           Family History:   Problem Relation Age of Onset    Heart Disease Mother     Stroke Father     Cancer Sister        Social History     Socioeconomic History    Marital status:      Spouse name: Not on file    Number of children: Not on file    Years of education: Not on file    Highest education level: Not on file   Occupational History    Not on file   Tobacco Use    Smoking status: Never    Smokeless tobacco: Never   Vaping Use    Vaping Use: Never used   Substance and Sexual Activity    Alcohol use: Yes     Alcohol/week: 1.0 standard drink     Types: 1 Glasses of wine per week     Comment: occas. Drug use:  No Sexual activity: Not Currently   Other Topics Concern    Not on file   Social History Narrative    Not on file     Social Determinants of Health     Financial Resource Strain: Not on file   Food Insecurity: Not on file   Transportation Needs: Not on file   Physical Activity: Not on file   Stress: Not on file   Social Connections: Not on file   Intimate Partner Violence: Not on file   Housing Stability: Not on file         ALLERGIES: Ace inhibitors and Lisinopril    Review of Systems   Gastrointestinal:  Positive for constipation. Musculoskeletal:  Positive for back pain. Vitals:    01/23/23 1222 01/23/23 1224   BP:  (!) 140/92   Pulse:  84   Resp:  16   Temp:  98.8 °F (37.1 °C)   SpO2:  96%   Weight: 54 kg (119 lb)    Height: 5' (1.524 m)             Physical Exam  Vitals and nursing note reviewed. Constitutional:       Appearance: Normal appearance. Comments: Elderly female, appears stated age   HENT:      Head: Normocephalic and atraumatic. Mouth/Throat:      Mouth: Mucous membranes are moist.   Eyes:      Extraocular Movements: Extraocular movements intact. Pupils: Pupils are equal, round, and reactive to light. Cardiovascular:      Rate and Rhythm: Normal rate. Pulmonary:      Effort: Pulmonary effort is normal. No respiratory distress. Musculoskeletal:         General: No swelling, tenderness, deformity or signs of injury. Normal range of motion. Cervical back: Normal range of motion and neck supple. Comments: She points to the right flank, lower back as area of her pain without discrete tenderness, deformity, or other abnormality seen   Skin:     General: Skin is warm and dry. Neurological:      General: No focal deficit present. Mental Status: She is alert and oriented to person, place, and time.    Psychiatric:         Mood and Affect: Mood normal.         Behavior: Behavior normal.        Medical Decision Making  27-year-old female presents as above with back pain.  Most consistent with muscle strain with chronic changes but no acute findings on CT scan to explain her pain. She does report that she twisted in her chair several days before the pain started, this may be the inciting event. She has had some relief with [employer].  I will refill her Norco as well as provide a muscle relaxer with instructions to be careful when taking them. We will also give Colace for as long as she is on Norco.  She should follow-up with pain management as scheduled. Amount and/or Complexity of Data Reviewed  Independent Historian: caregiver  Labs: ordered. Radiology: ordered and independent interpretation performed. Decision-making details documented in ED Course. Risk  Prescription drug management. ED Course as of 01/23/23 1338   Mon Jan 23, 2023   1259 I have independently viewed the obtained radiographic images and note CT lumbar spine with age-related degenerative changes, no acute fracture. Will await radiology read.  [JM]      ED Course User Index  [JM] Dianne Young MD       Procedures

## 2023-02-01 ENCOUNTER — TRANSCRIBE ORDER (OUTPATIENT)
Dept: SCHEDULING | Age: 88
End: 2023-02-01

## 2023-02-01 DIAGNOSIS — M54.50 LUMBAR PAIN: Primary | ICD-10-CM

## 2023-02-08 ENCOUNTER — HOSPITAL ENCOUNTER (OUTPATIENT)
Dept: MRI IMAGING | Age: 88
Discharge: HOME | End: 2023-02-08
Attending: STUDENT IN AN ORGANIZED HEALTH CARE EDUCATION/TRAINING PROGRAM
Payer: COMMERCIAL

## 2023-02-08 DIAGNOSIS — M54.50 LUMBAR PAIN: ICD-10-CM

## 2023-02-08 PROCEDURE — 72148 MRI LUMBAR SPINE W/O DYE: CPT

## 2023-04-02 ENCOUNTER — HOSPITAL ENCOUNTER (EMERGENCY)
Age: 88
Discharge: HOME | End: 2023-04-02
Attending: EMERGENCY MEDICINE
Payer: COMMERCIAL

## 2023-04-02 ENCOUNTER — HOSPITAL ENCOUNTER (EMERGENCY)
Dept: GENERAL RADIOLOGY | Age: 88
End: 2023-04-02
Attending: EMERGENCY MEDICINE
Payer: COMMERCIAL

## 2023-04-02 DIAGNOSIS — S32.010A CLOSED COMPRESSION FRACTURE OF BODY OF L1 VERTEBRA (HCC): Primary | ICD-10-CM

## 2023-04-02 PROCEDURE — 74011250637 HC RX REV CODE- 250/637: Performed by: EMERGENCY MEDICINE

## 2023-04-02 PROCEDURE — 99283 EMERGENCY DEPT VISIT LOW MDM: CPT

## 2023-04-02 PROCEDURE — 72100 X-RAY EXAM L-S SPINE 2/3 VWS: CPT

## 2023-04-02 PROCEDURE — 72170 X-RAY EXAM OF PELVIS: CPT

## 2023-04-02 RX ORDER — HYDROCODONE BITARTRATE AND ACETAMINOPHEN 5; 325 MG/1; MG/1
1 TABLET ORAL
Qty: 10 TABLET | Refills: 0 | Status: SHIPPED | OUTPATIENT
Start: 2023-04-02 | End: 2023-04-05

## 2023-04-02 RX ORDER — HYDROCODONE BITARTRATE AND ACETAMINOPHEN 5; 325 MG/1; MG/1
1 TABLET ORAL
Status: COMPLETED
Start: 2023-04-02 | End: 2023-04-02

## 2023-04-02 RX ADMIN — HYDROCODONE BITARTRATE AND ACETAMINOPHEN 1 TABLET: 5; 325 TABLET ORAL at 16:18

## 2023-04-02 NOTE — ED PROVIDER NOTES
Date of Service:  4/2/2023    Patient:  Phoenix Savage    Chief Complaint:  Loreda Teja       HPI:  Phoenix Savage is a 80 y.o.  female who presents for evaluation of a fall. Patient was getting of the shower with the bar that she is regards for broke and she fell. Patient states that she fell against the wall and then fell onto her buttocks in the shower. She did not hit her head or lose consciousness. She was able to get up and get out of the shower after this happened. Recent kyphoplasty with continued pain in the lower back and right side of her hip. She comes in with complaints of continued discomfort does not necessarily acute since this morning which is slightly worse. She denies pain with movement of the hips. She denies head strike loss of consciousness or other neck or back pain       Past Medical History:   Diagnosis Date    Anxiety     Atrial fibrillation ([facility] [address] 2021 Dr Kenny Breath    Dysphagia     Eczema 2021 Dr. Gregory Irizarry    Insomnia     contract signed 11/2017    Macular degeneration     Osteoarthritis     Pancreatitis 2010    Pneumonia 2016    PVD (peripheral vascular disease) ([facility] [address]     SIADH (syndrome of inappropriate ADH production) ([facility] [facility]     Vertigo        Past Surgical History:   Procedure Laterality Date    HX CHOLECYSTECTOMY      HX GYN           Family History:   Problem Relation Age of Onset    Heart Disease Mother     Stroke Father     Cancer Sister        Social History     Socioeconomic History    Marital status:      Spouse name: Not on file    Number of children: Not on file    Years of education: Not on file    Highest education level: Not on file   Occupational History    Not on file   Tobacco Use    Smoking status: Never    Smokeless tobacco: Never   Vaping Use    Vaping Use: Never used   Substance and Sexual Activity    Alcohol use: Yes     Alcohol/week: 1.0 standard drink     Types: 1 Glasses of wine per week     Comment: occas.      Drug use: No    Sexual activity: Not Currently Other Topics Concern    Not on file   Social History Narrative    Not on file     Social Determinants of Health     Financial Resource Strain: Not on file   Food Insecurity: Not on file   Transportation Needs: Not on file   Physical Activity: Not on file   Stress: Not on file   Social Connections: Not on file   Intimate Partner Violence: Not on file   Housing Stability: Not on file         ALLERGIES: Ace inhibitors and Lisinopril    Review of Systems   All other systems reviewed and are negative. Vitals:    04/02/23 1358   BP: (!) 169/95   SpO2: 97%            Physical Exam  Vitals and nursing note reviewed. Constitutional:       Appearance: Normal appearance. HENT:      Head: Normocephalic and atraumatic. Cardiovascular:      Rate and Rhythm: Normal rate. Pulmonary:      Effort: Pulmonary effort is normal.   Abdominal:      General: Abdomen is flat. Musculoskeletal:         General: No deformity or signs of injury. Normal range of motion. Skin:     General: Skin is warm. Neurological:      Mental Status: She is alert and oriented to person, place, and time. Motor: No weakness. Psychiatric:         Mood and Affect: Mood normal.        Medical Decision Making  Amount and/or Complexity of Data Reviewed  Radiology: ordered. Decision-making details documented in ED Course. Risk  Prescription drug management. ED Course as of 04/02/23 1815   Sun Apr 02, 2023   1547 The patient evaluation of multiple views of the spine and pelvis did not reveal any obvious acute fracture today. Note made of previous kyphoplasty and vacuum disc [GG]      ED Course User Index  [GG] Reynaldo Rockwells,      VITAL SIGNS:  Patient Vitals for the past 4 hrs:   BP SpO2   04/02/23 1503 (!) 158/88 97 %   04/02/23 1434 (!) 146/65 97 %           LABS:  The Following labs have been ordered while in the emergency department and I have independently evaluated them.      No results found for this or any previous visit (from the past 6 hour(s)). IMAGING:  The Following imaging studies have been ordered while in the emergency department and I have reviewed them. XR SPINE LUMB 2 OR 3 V   Final Result      1. L1 compression fracture with mild loss of height, new since February 8.   2. Interval kyphoplasty changes at T12. XR PELV 1 OR 2 V   Final Result   No acute osseous abnormality. Medications During Visit:  I ordered/approved the ordering of the following medicines for the patient while in the emergency department. Medications   HYDROcodone-acetaminophen (NORCO) 5-325 mg per tablet 1 Tablet (1 Tablet Oral Given 4/2/23 1618)         DECISION MAKING:  Ama Grigsby is a 80 y.o. female who comes in as above. Compression fracture as noted above. Patient was seen for compression fracture recently and trialed a TLSO brace which she states did not help and she does not want one now. She has follow-up tomorrow with her interventional radiologist.  At this time given the fact that her pain is well controlled I will discharge her home. She can speak with the radiologist tomorrow about additional procedure on the new compression fracture and return as needed. Family and patient agreeable to plan. IMPRESSION:  1. Closed compression fracture of body of L1 vertebra (HCC)        DISPOSITION:  Discharged      Discharge Medication List as of 4/2/2023  4:14 PM        START taking these medications    Details   !! HYDROcodone-acetaminophen (Norco) 5-325 mg per tablet Take 1 Tablet by mouth every four (4) hours as needed for Pain for up to 3 days. Max Daily Amount: 6 Tablets., Normal, Disp-10 Tablet, R-0       !! - Potential duplicate medications found. Please discuss with provider.         CONTINUE these medications which have NOT CHANGED    Details   !! HYDROcodone-acetaminophen (NORCO) 5-325 mg per tablet Take  by mouth., Historical Med      diclofenac (VOLTAREN) 1 % gel Apply  to affected area four (4) times daily. Left foot/ankle, Historical Med      levothyroxine (SYNTHROID) 25 mcg tablet TAKE 1 TABLET BY MOUTH DAILY BEFORE BREAKFAST, Normal, Disp-90 Tablet, R-1      omeprazole (PRILOSEC OTC) 20 mg tablet Take 20 mg by mouth daily. , Historical Med      bumetanide (BUMEX) 1 mg tablet Take 2 Tablets by mouth daily. , No Print, Disp-90 Tablet, R-1      amLODIPine (NORVASC) 5 mg tablet Take 5 mg by mouth daily. , Historical Med      metoprolol succinate (TOPROL-XL) 25 mg XL tablet two (2) times a day., Historical Med      ELIQUIS 2.5 mg tablet TK UTD PO  BID, Historical Med, R-3, URBANO      vit A/vit C/vit E/zinc/copper (PRESERVISION AREDS PO) Take  by mouth., Historical Med      ondansetron hcl (ZOFRAN) 4 mg tablet Take 4 mg by mouth every eight (8) hours as needed for Nausea or Vomiting., Historical Med      traMADoL (ULTRAM) 50 mg tablet Historical Med      acetaminophen (TYLENOL) 650 mg TbER Take 650 mg by mouth every eight (8) hours. , Historical Med       !! - Potential duplicate medications found. Please discuss with provider. Follow-up Information       Follow up With Specialties Details Why Contact Info    Eitan Rodriguez MD Family Medicine Schedule an appointment as soon as possible for a visit   [address] [facility]  [phone]      Your Specialist  Go in 1 day                The patient is asked to follow-up with their primary care provider in the next several days. They are to call tomorrow for an appointment. The patient is asked to return promptly for any increased concerns or worsening of symptoms. They can return to this emergency department or any other emergency department.       Procedures

## 2023-04-02 NOTE — ED TRIAGE NOTES
Pt presents to ED with c/o pain in right buttock. Pt states she was in her shower and attempted to grab a handle that came off the wall causing her to fall. She denies other injuries. She had recent Kyphoplasty on T12 on 3/1/2023.

## 2023-04-29 RX ORDER — ONDANSETRON 4 MG/1
TABLET, FILM COATED ORAL EVERY 8 HOURS PRN
COMMUNITY

## 2023-04-29 RX ORDER — HYDROCODONE BITARTRATE AND ACETAMINOPHEN 5; 325 MG/1; MG/1
TABLET ORAL
COMMUNITY

## 2023-04-29 RX ORDER — LEVOTHYROXINE SODIUM 0.03 MG/1
TABLET ORAL
COMMUNITY
Start: 2022-12-07 | End: 2023-05-24

## 2023-04-29 RX ORDER — SENNOSIDES 8.6 MG
CAPSULE ORAL EVERY 8 HOURS
COMMUNITY

## 2023-04-29 RX ORDER — OMEPRAZOLE 20 MG/1
TABLET, DELAYED RELEASE ORAL DAILY
COMMUNITY

## 2023-04-29 RX ORDER — METOPROLOL SUCCINATE 25 MG/1
TABLET, EXTENDED RELEASE ORAL 2 TIMES DAILY
COMMUNITY
Start: 2019-12-16

## 2023-04-29 RX ORDER — AMLODIPINE BESYLATE 5 MG/1
TABLET ORAL DAILY
COMMUNITY

## 2023-04-29 RX ORDER — TRAMADOL HYDROCHLORIDE 50 MG/1
TABLET ORAL
COMMUNITY
Start: 2022-07-08 | End: 2023-05-30

## 2023-04-29 RX ORDER — BUMETANIDE 1 MG/1
TABLET ORAL DAILY
COMMUNITY
Start: 2022-04-11

## 2023-04-29 RX ORDER — CYCLOBENZAPRINE HCL 5 MG
TABLET ORAL 3 TIMES DAILY PRN
COMMUNITY
Start: 2023-01-23 | End: 2023-05-30

## 2023-05-24 RX ORDER — LEVOTHYROXINE SODIUM 0.03 MG/1
TABLET ORAL
Qty: 90 TABLET | Refills: 1 | Status: SHIPPED | OUTPATIENT
Start: 2023-05-24

## 2023-05-30 ENCOUNTER — OFFICE VISIT (OUTPATIENT)
Facility: CLINIC | Age: 88
End: 2023-05-30
Payer: COMMERCIAL

## 2023-05-30 VITALS
DIASTOLIC BLOOD PRESSURE: 73 MMHG | WEIGHT: 109.6 LBS | RESPIRATION RATE: 20 BRPM | OXYGEN SATURATION: 97 % | BODY MASS INDEX: 21.52 KG/M2 | HEART RATE: 71 BPM | HEIGHT: 60 IN | SYSTOLIC BLOOD PRESSURE: 137 MMHG | TEMPERATURE: 97 F

## 2023-05-30 DIAGNOSIS — N30.00 ACUTE CYSTITIS WITHOUT HEMATURIA: Primary | ICD-10-CM

## 2023-05-30 DIAGNOSIS — I10 ESSENTIAL HYPERTENSION: ICD-10-CM

## 2023-05-30 DIAGNOSIS — M25.572 CHRONIC PAIN OF LEFT ANKLE: ICD-10-CM

## 2023-05-30 DIAGNOSIS — Z91.81 AT HIGH RISK FOR FALLS: ICD-10-CM

## 2023-05-30 DIAGNOSIS — G89.29 CHRONIC PAIN OF LEFT ANKLE: ICD-10-CM

## 2023-05-30 DIAGNOSIS — Z79.01 CHRONIC ANTICOAGULATION: ICD-10-CM

## 2023-05-30 DIAGNOSIS — I48.91 ATRIAL FIBRILLATION, UNSPECIFIED TYPE (HCC): ICD-10-CM

## 2023-05-30 PROCEDURE — 99214 OFFICE O/P EST MOD 30 MIN: CPT | Performed by: FAMILY MEDICINE

## 2023-05-30 PROCEDURE — 1123F ACP DISCUSS/DSCN MKR DOCD: CPT | Performed by: FAMILY MEDICINE

## 2023-05-30 RX ORDER — NITROFURANTOIN 25; 75 MG/1; MG/1
CAPSULE ORAL
COMMUNITY
End: 2023-05-30

## 2023-05-30 RX ORDER — TRIAMCINOLONE ACETONIDE 1 MG/G
CREAM TOPICAL
COMMUNITY
End: 2023-05-30

## 2023-05-30 RX ORDER — POTASSIUM CHLORIDE 20 MEQ/1
TABLET, EXTENDED RELEASE ORAL
COMMUNITY
Start: 2023-04-12

## 2023-05-30 RX ORDER — CEPHALEXIN 500 MG/1
500 CAPSULE ORAL 3 TIMES DAILY
Qty: 30 CAPSULE | Refills: 0 | Status: SHIPPED | OUTPATIENT
Start: 2023-05-30

## 2023-05-30 RX ORDER — ZOLPIDEM TARTRATE 5 MG/1
TABLET ORAL
COMMUNITY
End: 2023-05-30

## 2023-05-30 RX ORDER — GABAPENTIN 100 MG/1
100 CAPSULE ORAL 2 TIMES DAILY
COMMUNITY
Start: 2023-01-27 | End: 2023-05-30

## 2023-05-30 SDOH — ECONOMIC STABILITY: INCOME INSECURITY: HOW HARD IS IT FOR YOU TO PAY FOR THE VERY BASICS LIKE FOOD, HOUSING, MEDICAL CARE, AND HEATING?: NOT HARD AT ALL

## 2023-05-30 SDOH — ECONOMIC STABILITY: HOUSING INSECURITY
IN THE LAST 12 MONTHS, WAS THERE A TIME WHEN YOU DID NOT HAVE A STEADY PLACE TO SLEEP OR SLEPT IN A SHELTER (INCLUDING NOW)?: NO

## 2023-05-30 SDOH — ECONOMIC STABILITY: FOOD INSECURITY: WITHIN THE PAST 12 MONTHS, THE FOOD YOU BOUGHT JUST DIDN'T LAST AND YOU DIDN'T HAVE MONEY TO GET MORE.: NEVER TRUE

## 2023-05-30 SDOH — ECONOMIC STABILITY: FOOD INSECURITY: WITHIN THE PAST 12 MONTHS, YOU WORRIED THAT YOUR FOOD WOULD RUN OUT BEFORE YOU GOT MONEY TO BUY MORE.: NEVER TRUE

## 2023-05-30 ASSESSMENT — PATIENT HEALTH QUESTIONNAIRE - PHQ9
1. LITTLE INTEREST OR PLEASURE IN DOING THINGS: 0
SUM OF ALL RESPONSES TO PHQ QUESTIONS 1-9: 0
SUM OF ALL RESPONSES TO PHQ QUESTIONS 1-9: 0
SUM OF ALL RESPONSES TO PHQ9 QUESTIONS 1 & 2: 0
SUM OF ALL RESPONSES TO PHQ QUESTIONS 1-9: 0
2. FEELING DOWN, DEPRESSED OR HOPELESS: 0
SUM OF ALL RESPONSES TO PHQ QUESTIONS 1-9: 0

## 2023-05-30 ASSESSMENT — ENCOUNTER SYMPTOMS
SHORTNESS OF BREATH: 0
ABDOMINAL PAIN: 0
BLOOD IN STOOL: 0

## 2023-05-30 NOTE — PROGRESS NOTES
dentified pt with two pt identifiers(name and ). Chief Complaint   Patient presents with    Leg Swelling     Left leg follow up & Edema    Urinary Tract Infection    Other     PT order for home health    Had 2 spinal fracture since January  Kyphoplasty in March - T1   Since then L1 fracture    Discuss Labs     Requesting labs    Medication Refill     Evenity from Dr. Kizzy Whitney Maintenance Due   Topic    DTaP/Tdap/Td vaccine (1 - Tdap)    Pneumococcal 65+ years Vaccine (1 - PCV)    Shingles vaccine (2 of 2)    COVID-19 Vaccine (2 - Moderna series)       Wt Readings from Last 3 Encounters:   23 109 lb 9.6 oz (49.7 kg)   22 118 lb (53.5 kg)   22 118 lb (53.5 kg)     Temp Readings from Last 3 Encounters:   23 97 °F (36.1 °C) (Temporal)     BP Readings from Last 3 Encounters:   23 137/73   23 (!) 158/88   22 (!) 148/82     Pulse Readings from Last 3 Encounters:   23 71   23 92   22 64           Coordination of Care Questionnaire:  :   1. \"Have you been to the ER, urgent care clinic since your last visit? Hospitalized since your last visit? \" no    2. \"Have you seen or consulted any other health care providers outside of the [address] [place] [address] since your last visit? \" no     3. For patients aged 39-70: Has the patient had a colonoscopy / FIT/ Cologuard? no      If the patient is female:    4. For patients aged 41-77: Has the patient had a mammogram within the past 2 years? no      5. For patients aged 21-65: Has the patient had a pap smear? no     3) Do you have an Advance Directive on file? no  Are you interested in receiving information about Advance Directives? no    Patient is accompanied by Billy Mccann 69 I have received verbal consent from Fly Mcneil to discuss any/all medical information while they are present in the room.

## 2023-05-30 NOTE — PROGRESS NOTES
Miriam Perez  80 y.o. female  11/6/1923  WIW:[id number]  [facility]  Progress Note     Encounter Date: 5/30/2023    Assessment and Plan:       ICD-10-CM    1. Acute cystitis without hematuria  N30.00 Urinalysis with Reflex to Culture     cephALEXin (KEFLEX) 500 MG capsule     Urinalysis with Reflex to Culture      2. Essential hypertension  M21 Basic Metabolic Panel     Basic Metabolic Panel      3. Atrial fibrillation, unspecified type ([address]  I48.91       4. Chronic anticoagulation  Z79.01 CBC with Auto Differential     CBC with Auto Differential      5. At high risk for falls  Z91.81 [facility] [place]      6. Chronic pain of left ankle  M25.572     G89.29         1. Acute cystitis without hematuria  Possible UTI-no dysuria, odor is main complaint  Check culture and C&S  Cephalexin  -     Urinalysis with Reflex to Culture; Future  -     cephALEXin (KEFLEX) 500 MG capsule; Take 1 capsule by mouth 3 times daily, Disp-30 capsule, R-0Normal    2. Essential hypertension  At goal  Check renal function  -     Basic Metabolic Panel; Future    3. Atrial fibrillation, unspecified type ([address]  Rate controlled. On Eliquis without problems. 4. Chronic anticoagulation  On Eliquis  -     CBC with Auto Differential; Future    5. At high risk for falls  Patient requests PT  -     [facility] [place]    6. 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 I have discussed the diagnosis with the patient and the intended plan as seen in the above orders. she has expressed understanding. The patient has received an after-visit summary and questions were answered concerning future plans. I have discussed medication side effects and warnings with the patient as well.     Electronically Signed: Eb Morales MD    Current Medications after this visit     Current

## 2023-05-31 ENCOUNTER — TELEPHONE (OUTPATIENT)
Facility: CLINIC | Age: 88
End: 2023-05-31

## 2023-05-31 LAB
ANION GAP SERPL CALC-SCNC: 10 MMOL/L (ref 5–15)
APPEARANCE UR: ABNORMAL
BACTERIA URNS QL MICRO: ABNORMAL /HPF
BASOPHILS # BLD: 0.1 K/UL (ref 0–0.1)
BASOPHILS NFR BLD: 1 % (ref 0–1)
BILIRUB UR QL: NEGATIVE
BUN SERPL-MCNC: 18 MG/DL (ref 6–20)
BUN/CREAT SERPL: 33 (ref 12–20)
CALCIUM SERPL-MCNC: 8.9 MG/DL (ref 8.5–10.1)
CHLORIDE SERPL-SCNC: 97 MMOL/L (ref 97–108)
CO2 SERPL-SCNC: 29 MMOL/L (ref 21–32)
COLOR UR: ABNORMAL
CREAT SERPL-MCNC: 0.54 MG/DL (ref 0.55–1.02)
DIFFERENTIAL METHOD BLD: ABNORMAL
EOSINOPHIL # BLD: 0.1 K/UL (ref 0–0.4)
EOSINOPHIL NFR BLD: 1 % (ref 0–7)
EPITH CASTS URNS QL MICRO: ABNORMAL /LPF
ERYTHROCYTE [DISTWIDTH] IN BLOOD BY AUTOMATED COUNT: 13.7 % (ref 11.5–14.5)
GLUCOSE SERPL-MCNC: 94 MG/DL (ref 65–100)
GLUCOSE UR STRIP.AUTO-MCNC: NEGATIVE MG/DL
HCT VFR BLD AUTO: 42.5 % (ref 35–47)
HGB BLD-MCNC: 13.5 G/DL (ref 11.5–16)
HGB UR QL STRIP: ABNORMAL
IMM GRANULOCYTES # BLD AUTO: 0.1 K/UL (ref 0–0.04)
IMM GRANULOCYTES NFR BLD AUTO: 0 % (ref 0–0.5)
KETONES UR QL STRIP.AUTO: NEGATIVE MG/DL
LEUKOCYTE ESTERASE UR QL STRIP.AUTO: ABNORMAL
LYMPHOCYTES # BLD: 2.9 K/UL (ref 0.8–3.5)
LYMPHOCYTES NFR BLD: 23 % (ref 12–49)
MCH RBC QN AUTO: 31 PG (ref 26–34)
MCHC RBC AUTO-ENTMCNC: 31.8 G/DL (ref 30–36.5)
MCV RBC AUTO: 97.7 FL (ref 80–99)
MONOCYTES # BLD: 1 K/UL (ref 0–1)
MONOCYTES NFR BLD: 8 % (ref 5–13)
NEUTS SEG # BLD: 8.5 K/UL (ref 1.8–8)
NEUTS SEG NFR BLD: 67 % (ref 32–75)
NITRITE UR QL STRIP.AUTO: POSITIVE
NRBC # BLD: 0 K/UL (ref 0–0.01)
NRBC BLD-RTO: 0 PER 100 WBC
PH UR STRIP: 5.5 (ref 5–8)
PLATELET # BLD AUTO: 372 K/UL (ref 150–400)
PMV BLD AUTO: 9.5 FL (ref 8.9–12.9)
POTASSIUM SERPL-SCNC: 3.3 MMOL/L (ref 3.5–5.1)
PROT UR STRIP-MCNC: ABNORMAL MG/DL
RBC # BLD AUTO: 4.35 M/UL (ref 3.8–5.2)
RBC #/AREA URNS HPF: ABNORMAL /HPF (ref 0–5)
SODIUM SERPL-SCNC: 136 MMOL/L (ref 136–145)
SP GR UR REFRACTOMETRY: 1.02 (ref 1–1.03)
URINE CULTURE IF INDICATED: ABNORMAL
UROBILINOGEN UR QL STRIP.AUTO: 1 EU/DL (ref 0.2–1)
WBC # BLD AUTO: 12.6 K/UL (ref 3.6–11)
WBC URNS QL MICRO: ABNORMAL /HPF (ref 0–4)

## 2023-05-31 NOTE — TELEPHONE ENCOUNTER
Called with labs. Low K+  Currently reports she takes potassium every day, not M,W,F  Advised to take two per day for three days then return to one every day. She repeats those instructions back to me. Awaiting urine culture.   [facility]

## 2023-06-02 LAB
BACTERIA SPEC CULT: ABNORMAL
CC UR VC: ABNORMAL
SERVICE CMNT-IMP: ABNORMAL

## 2023-06-05 ENCOUNTER — TELEPHONE (OUTPATIENT)
Facility: CLINIC | Age: 88
End: 2023-06-05

## 2023-06-05 NOTE — TELEPHONE ENCOUNTER
Pt's daughter is stating that when the order was given to Pt it was ordered though out pt PT and she would like to request that her mom have Home health care through [address] [place] [address] is the fax number for them     Daughter: Rudi Mendez [phone]

## 2023-06-07 ENCOUNTER — TELEPHONE (OUTPATIENT)
Facility: CLINIC | Age: 88
End: 2023-06-07

## 2023-06-29 ENCOUNTER — TELEPHONE (OUTPATIENT)
Facility: CLINIC | Age: 88
End: 2023-06-29

## 2023-06-29 NOTE — TELEPHONE ENCOUNTER
Mandy Avila PT from At [facility] ([phone]) states that pt is not showing improvement in his PT and she is requesting to extend PT by 3 weeks 2 times a week.       Please advise

## 2023-09-06 ENCOUNTER — OFFICE VISIT (OUTPATIENT)
Facility: CLINIC | Age: 88
End: 2023-09-06
Payer: COMMERCIAL

## 2023-09-06 VITALS
HEART RATE: 74 BPM | HEIGHT: 60 IN | SYSTOLIC BLOOD PRESSURE: 119 MMHG | RESPIRATION RATE: 16 BRPM | WEIGHT: 109 LBS | BODY MASS INDEX: 21.4 KG/M2 | OXYGEN SATURATION: 95 % | TEMPERATURE: 97.6 F | DIASTOLIC BLOOD PRESSURE: 84 MMHG

## 2023-09-06 DIAGNOSIS — E03.9 HYPOTHYROIDISM, UNSPECIFIED TYPE: ICD-10-CM

## 2023-09-06 DIAGNOSIS — Z87.440 HISTORY OF UTI: ICD-10-CM

## 2023-09-06 DIAGNOSIS — I10 ESSENTIAL HYPERTENSION: Primary | ICD-10-CM

## 2023-09-06 PROCEDURE — 99214 OFFICE O/P EST MOD 30 MIN: CPT | Performed by: NURSE PRACTITIONER

## 2023-09-06 PROCEDURE — 1123F ACP DISCUSS/DSCN MKR DOCD: CPT | Performed by: NURSE PRACTITIONER

## 2023-09-06 RX ORDER — LEVOTHYROXINE SODIUM 0.03 MG/1
25 TABLET ORAL
Qty: 90 TABLET | Refills: 1 | Status: SHIPPED | OUTPATIENT
Start: 2023-09-06

## 2023-09-06 SDOH — ECONOMIC STABILITY: FOOD INSECURITY: WITHIN THE PAST 12 MONTHS, YOU WORRIED THAT YOUR FOOD WOULD RUN OUT BEFORE YOU GOT MONEY TO BUY MORE.: NEVER TRUE

## 2023-09-06 SDOH — ECONOMIC STABILITY: INCOME INSECURITY: HOW HARD IS IT FOR YOU TO PAY FOR THE VERY BASICS LIKE FOOD, HOUSING, MEDICAL CARE, AND HEATING?: NOT HARD AT ALL

## 2023-09-06 SDOH — ECONOMIC STABILITY: FOOD INSECURITY: WITHIN THE PAST 12 MONTHS, THE FOOD YOU BOUGHT JUST DIDN'T LAST AND YOU DIDN'T HAVE MONEY TO GET MORE.: NEVER TRUE

## 2023-09-06 ASSESSMENT — ENCOUNTER SYMPTOMS
ALLERGIC/IMMUNOLOGIC NEGATIVE: 1
EYES NEGATIVE: 1
GASTROINTESTINAL NEGATIVE: 1
RESPIRATORY NEGATIVE: 1

## 2023-09-06 ASSESSMENT — PATIENT HEALTH QUESTIONNAIRE - PHQ9
SUM OF ALL RESPONSES TO PHQ QUESTIONS 1-9: 0
SUM OF ALL RESPONSES TO PHQ9 QUESTIONS 1 & 2: 0
1. LITTLE INTEREST OR PLEASURE IN DOING THINGS: 0
SUM OF ALL RESPONSES TO PHQ QUESTIONS 1-9: 0
2. FEELING DOWN, DEPRESSED OR HOPELESS: 0

## 2023-09-06 NOTE — PATIENT INSTRUCTIONS
Continue all medications as prescribed  Get Immunizations as they are available  We will follow up with labs when they return

## 2023-09-07 LAB
ALBUMIN SERPL-MCNC: 3.8 G/DL (ref 3.5–5)
ALBUMIN/GLOB SERPL: 1.1 (ref 1.1–2.2)
ALP SERPL-CCNC: 140 U/L (ref 45–117)
ALT SERPL-CCNC: 22 U/L (ref 12–78)
ANION GAP SERPL CALC-SCNC: 8 MMOL/L (ref 5–15)
APPEARANCE UR: CLEAR
AST SERPL-CCNC: 23 U/L (ref 15–37)
BACTERIA URNS QL MICRO: NEGATIVE /HPF
BILIRUB SERPL-MCNC: 0.7 MG/DL (ref 0.2–1)
BILIRUB UR QL: NEGATIVE
BUN SERPL-MCNC: 17 MG/DL (ref 6–20)
BUN/CREAT SERPL: 29 (ref 12–20)
CALCIUM SERPL-MCNC: 8.9 MG/DL (ref 8.5–10.1)
CHLORIDE SERPL-SCNC: 98 MMOL/L (ref 97–108)
CO2 SERPL-SCNC: 32 MMOL/L (ref 21–32)
COLOR UR: ABNORMAL
CREAT SERPL-MCNC: 0.59 MG/DL (ref 0.55–1.02)
EPITH CASTS URNS QL MICRO: ABNORMAL /LPF
GLOBULIN SER CALC-MCNC: 3.5 G/DL (ref 2–4)
GLUCOSE SERPL-MCNC: 100 MG/DL (ref 65–100)
GLUCOSE UR STRIP.AUTO-MCNC: NEGATIVE MG/DL
HGB UR QL STRIP: NEGATIVE
KETONES UR QL STRIP.AUTO: NEGATIVE MG/DL
LEUKOCYTE ESTERASE UR QL STRIP.AUTO: ABNORMAL
NITRITE UR QL STRIP.AUTO: NEGATIVE
PH UR STRIP: 7 (ref 5–8)
POTASSIUM SERPL-SCNC: 3.4 MMOL/L (ref 3.5–5.1)
PROT SERPL-MCNC: 7.3 G/DL (ref 6.4–8.2)
PROT UR STRIP-MCNC: NEGATIVE MG/DL
RBC #/AREA URNS HPF: ABNORMAL /HPF (ref 0–5)
SODIUM SERPL-SCNC: 138 MMOL/L (ref 136–145)
SP GR UR REFRACTOMETRY: 1.01 (ref 1–1.03)
TSH SERPL DL<=0.05 MIU/L-ACNC: 2.66 UIU/ML (ref 0.36–3.74)
URINE CULTURE IF INDICATED: ABNORMAL
UROBILINOGEN UR QL STRIP.AUTO: 0.2 EU/DL (ref 0.2–1)
WBC URNS QL MICRO: ABNORMAL /HPF (ref 0–4)

## 2023-09-26 ENCOUNTER — TELEPHONE (OUTPATIENT)
Facility: CLINIC | Age: 88
End: 2023-09-26

## 2023-09-26 RX ORDER — CEFPODOXIME PROXETIL 100 MG/1
TABLET, FILM COATED ORAL
COMMUNITY
Start: 2023-08-31

## 2023-09-26 NOTE — TELEPHONE ENCOUNTER
Pt seen NP Lieutenant Jason tested positive for uti and need to talk to nurse for possibility for med to be order or advise [phone] (Daughter Janine Little)

## 2023-10-25 NOTE — TELEPHONE ENCOUNTER
I spoke to Ms. Arriaga and she said she went to an Urgent Care facility  And they gave her an antibiotic. She is doing ok. No symptoms of UTI.

## 2023-12-21 NOTE — ED NOTES
Addended by: CHENCHO PHOENIX on: 12/21/2023 09:44 AM     Modules accepted: Orders     Pt given discharge instructions by Dr Ashley Suh

## 2024-01-18 ENCOUNTER — OFFICE VISIT (OUTPATIENT)
Facility: CLINIC | Age: 89
End: 2024-01-18
Payer: COMMERCIAL

## 2024-01-18 VITALS
OXYGEN SATURATION: 97 % | TEMPERATURE: 97.3 F | HEART RATE: 76 BPM | BODY MASS INDEX: 21.79 KG/M2 | HEIGHT: 60 IN | SYSTOLIC BLOOD PRESSURE: 123 MMHG | RESPIRATION RATE: 20 BRPM | WEIGHT: 111 LBS | DIASTOLIC BLOOD PRESSURE: 71 MMHG

## 2024-01-18 DIAGNOSIS — R30.0 DYSURIA: Primary | ICD-10-CM

## 2024-01-18 DIAGNOSIS — R60.0 LOCALIZED EDEMA: ICD-10-CM

## 2024-01-18 DIAGNOSIS — M81.0 OSTEOPOROSIS WITHOUT CURRENT PATHOLOGICAL FRACTURE, UNSPECIFIED OSTEOPOROSIS TYPE: ICD-10-CM

## 2024-01-18 DIAGNOSIS — I10 ESSENTIAL HYPERTENSION: ICD-10-CM

## 2024-01-18 DIAGNOSIS — T88.7XXA SIDE EFFECT OF MEDICATION: ICD-10-CM

## 2024-01-18 PROCEDURE — 99214 OFFICE O/P EST MOD 30 MIN: CPT | Performed by: FAMILY MEDICINE

## 2024-01-18 PROCEDURE — 1123F ACP DISCUSS/DSCN MKR DOCD: CPT | Performed by: FAMILY MEDICINE

## 2024-01-18 ASSESSMENT — PATIENT HEALTH QUESTIONNAIRE - PHQ9
SUM OF ALL RESPONSES TO PHQ QUESTIONS 1-9: 0
2. FEELING DOWN, DEPRESSED OR HOPELESS: 0
SUM OF ALL RESPONSES TO PHQ QUESTIONS 1-9: 0
SUM OF ALL RESPONSES TO PHQ9 QUESTIONS 1 & 2: 0
1. LITTLE INTEREST OR PLEASURE IN DOING THINGS: 0

## 2024-01-18 ASSESSMENT — ENCOUNTER SYMPTOMS: SHORTNESS OF BREATH: 0

## 2024-01-18 NOTE — PROGRESS NOTES
Alyson Arriaga  89 y.o. female  11/6/1923  MRN:[id number]  [facility]  Progress Note     Encounter Date: 1/18/2024    Assessment and Plan:       ICD-10-CM    1. Dysuria  R30.0 Urinalysis with Reflex to Culture      2. Osteoporosis without current pathological fracture, unspecified osteoporosis type  M81.0       3. Side effect of medication  T88.7XXA       4. Localized edema  R60.0       5. Essential hypertension  I10         1. Dysuria  Perhaps a side effect of Evenity  Has other sx that are likely side effects: paresthesias, muscle spasm  Discuss with RHEUM  IF continues, there should be multiple other options for treatment of osteoporosis  -     Urinalysis with Reflex to Culture; Future  Hematuria on UA should still prompt UROLOGY evaluation  2. Osteoporosis without current pathological fracture, unspecified osteoporosis type  3. Side effect of medication as above  4. Localized edema  Left lower leg  5. Essential hypertension  Wonder if amlodipine may play a role in her edema  Trial off of that med for 10-14 days.  IF edema improves, we will find another med.  IF not resume amlodipine.  Daughter will monitor BP while off of the amlodipine.       I have discussed the diagnosis with the patient and the intended plan as seen in the above orders.  she has expressed understanding.  The patient has received an after-visit summary and questions were answered concerning future plans.  I have discussed medication side effects and warnings with the patient as well.    Electronically Signed: Nima Mackenzie MD    Current Medications after this visit     Current Outpatient Medications   Medication Sig    cefpodoxime (VANTIN) 100 MG tablet TAKE 1 TABLET BY MOUTH EVERY 12 HOURS FOR 5 DAYS    Polyethyl Glycol-Propyl Glycol (SYSTANE OP) Apply to eye    levothyroxine (SYNTHROID) 25 MCG tablet Take 1 tablet by mouth every morning (before breakfast)    potassium chloride (KLOR-CON M) 20 MEQ extended

## 2024-01-18 NOTE — PROGRESS NOTES
dentified pt with two pt identifiers(name and ).    Chief Complaint   Patient presents with    Discuss Labs     Caregiver requesting lab order    Discuss Medications     Bladder irritations side effect from bone density medication - Evenity        Health Maintenance Due   Topic    DTaP/Tdap/Td vaccine (1 - Tdap)    COVID-19 Vaccine ( season)       Wt Readings from Last 3 Encounters:   24 50.3 kg (111 lb)   23 49.4 kg (109 lb)   23 49.7 kg (109 lb 9.6 oz)     Temp Readings from Last 3 Encounters:   24 97.3 °F (36.3 °C) (Temporal)   23 97.6 °F (36.4 °C) (Skin)   23 97 °F (36.1 °C) (Temporal)     BP Readings from Last 3 Encounters:   24 123/71   23 119/84   23 137/73     Pulse Readings from Last 3 Encounters:   24 76   23 74   23 71           Coordination of Care Questionnaire:  :   1. \"Have you been to the ER, urgent care clinic since your last visit?  Hospitalized since your last visit?\" no    2. \"Have you seen or consulted any other health care providers outside of the [facility] since your last visit?\" no     3. For patients aged 45-75: Has the patient had a colonoscopy / FIT/ Cologuard? no      If the patient is female:    4. For patients aged 40-74: Has the patient had a mammogram within the past 2 years? no      5. For patients aged 21-65: Has the patient had a pap smear?    no  3) Do you have an Advance Directive on file? no  Are you interested in receiving information about Advance Directives? no    Patient is accompanied by Daughter N Law I have received verbal consent from Alyson Arriaga to discuss any/all medical information while they are present in the room.

## 2024-01-19 LAB
APPEARANCE UR: CLEAR
BACTERIA URNS QL MICRO: NEGATIVE /HPF
BILIRUB UR QL: NEGATIVE
COLOR UR: ABNORMAL
EPITH CASTS URNS QL MICRO: ABNORMAL /LPF
GLUCOSE UR STRIP.AUTO-MCNC: NEGATIVE MG/DL
HGB UR QL STRIP: NEGATIVE
KETONES UR QL STRIP.AUTO: NEGATIVE MG/DL
LEUKOCYTE ESTERASE UR QL STRIP.AUTO: ABNORMAL
NITRITE UR QL STRIP.AUTO: NEGATIVE
PH UR STRIP: 5.5 (ref 5–8)
PROT UR STRIP-MCNC: NEGATIVE MG/DL
RBC #/AREA URNS HPF: ABNORMAL /HPF (ref 0–5)
SP GR UR REFRACTOMETRY: 1.01 (ref 1–1.03)
URINE CULTURE IF INDICATED: ABNORMAL
UROBILINOGEN UR QL STRIP.AUTO: 0.2 EU/DL (ref 0.2–1)
WBC URNS QL MICRO: ABNORMAL /HPF (ref 0–4)

## 2024-01-26 ENCOUNTER — TELEPHONE (OUTPATIENT)
Facility: CLINIC | Age: 89
End: 2024-01-26

## 2024-01-26 NOTE — TELEPHONE ENCOUNTER
Patient daughter Kiarra called to request order for [facility] for PT for general strength and balance.

## 2024-01-28 DIAGNOSIS — Z91.81 AT HIGH RISK FOR FALLS: Primary | ICD-10-CM

## 2024-01-28 DIAGNOSIS — R53.1 WEAKNESS: ICD-10-CM

## 2024-01-29 NOTE — TELEPHONE ENCOUNTER
[facility].  [facility] notified to reach out to patient.    Kiarra notified.    Fax#: [phone]  Phone#: [phone]

## 2024-02-01 NOTE — TELEPHONE ENCOUNTER
Yashira from [facility] calling regarding request for in home services requesting more information as to why patient needs PT strength and balance. Requesting more information for medicare purposes.

## 2024-02-03 ENCOUNTER — HOSPITAL ENCOUNTER (EMERGENCY)
Facility: HOSPITAL | Age: 89
Discharge: HOME | End: 2024-02-03
Attending: STUDENT IN AN ORGANIZED HEALTH CARE EDUCATION/TRAINING PROGRAM
Payer: COMMERCIAL

## 2024-02-03 VITALS
RESPIRATION RATE: 16 BRPM | DIASTOLIC BLOOD PRESSURE: 87 MMHG | OXYGEN SATURATION: 95 % | SYSTOLIC BLOOD PRESSURE: 182 MMHG | TEMPERATURE: 97.9 F | WEIGHT: 109 LBS | HEART RATE: 97 BPM | HEIGHT: 60 IN | BODY MASS INDEX: 21.4 KG/M2

## 2024-02-03 DIAGNOSIS — L03.115 CELLULITIS OF RIGHT LOWER LEG: Primary | ICD-10-CM

## 2024-02-03 PROCEDURE — 99283 EMERGENCY DEPT VISIT LOW MDM: CPT

## 2024-02-03 PROCEDURE — 6370000000 HC RX 637 (ALT 250 FOR IP): Performed by: STUDENT IN AN ORGANIZED HEALTH CARE EDUCATION/TRAINING PROGRAM

## 2024-02-03 RX ORDER — DOXYCYCLINE HYCLATE 100 MG
100 TABLET ORAL 2 TIMES DAILY
Qty: 20 TABLET | Refills: 0 | Status: SHIPPED | OUTPATIENT
Start: 2024-02-03 | End: 2024-02-03

## 2024-02-03 RX ORDER — DOXYCYCLINE HYCLATE 100 MG
100 TABLET ORAL
Status: COMPLETED | OUTPATIENT
Start: 2024-02-03 | End: 2024-02-03

## 2024-02-03 RX ORDER — BACILLUS COAGULANS/INULIN 1B-250 MG
1 CAPSULE ORAL DAILY
Qty: 21 CAPSULE | Refills: 0 | Status: SHIPPED | OUTPATIENT
Start: 2024-02-03 | End: 2024-02-03

## 2024-02-03 RX ORDER — BACILLUS COAGULANS/INULIN 1B-250 MG
1 CAPSULE ORAL DAILY
Qty: 21 CAPSULE | Refills: 0 | Status: SHIPPED | OUTPATIENT
Start: 2024-02-03

## 2024-02-03 RX ORDER — DOXYCYCLINE HYCLATE 100 MG
100 TABLET ORAL 2 TIMES DAILY
Qty: 20 TABLET | Refills: 0 | Status: SHIPPED | OUTPATIENT
Start: 2024-02-03 | End: 2024-02-13

## 2024-02-03 RX ADMIN — DOXYCYCLINE HYCLATE 100 MG: 100 TABLET, COATED ORAL at 18:25

## 2024-02-03 ASSESSMENT — PAIN - FUNCTIONAL ASSESSMENT: PAIN_FUNCTIONAL_ASSESSMENT: NONE - DENIES PAIN

## 2024-02-03 NOTE — DISCHARGE INSTRUCTIONS
We decided together to not do another incision and drainage as the blood that is left is likely old and will not be able to be removed easily.  Take the prescribed antibiotic doxycycline to treat the infection of the skin.  Follow-up with the general surgery specialist using the number provided for reevaluation of the wound in 3 days.  If you develop fevers, chills, worsening redness, increased swelling or any other changing or worsening symptoms return to the emergency department for further care.

## 2024-02-03 NOTE — ED TRIAGE NOTES
Patient arrives ambulatory with walker, which is her baseline, to room with co right leg hematoma. Patient had I&D by dispatch [facility] 2 weeks ago and was put on keflex. Patient then had another I&D the next week due to the hematoma filling back up. Patient is now off of the keflex and says the redness and swelling is better but the wound is not yet healed. Patient has wound covered with large band aid. No redness or heat noted around the bandage.

## 2024-02-04 NOTE — ED PROVIDER NOTES
[facility] EMERGENCY DEPT  EMERGENCY DEPARTMENT ENCOUNTER      Pt Name: Alyson Arriaga  MRN: [id number]  Birthdate 11/6/1923  Date of evaluation: 2/3/2024  Provider: Ryan Santos MD    CHIEF COMPLAINT       Chief Complaint   Patient presents with    Wound Check       HISTORY OF PRESENT ILLNESS    HPI    89-year-old female presenting for right shin wound.  Patient is on Eliquis, does not remember actually being it but developed a swollen area and bruising to the right shin 10 days ago.  [facility] came to her house, performed an I&D and placed her on Keflex.  The surrounding redness seemed to improve, but despite that the swollen area remained.  [facility] came out again earlier this week and did another I&D.  Keflex has finished, the pain has improved.  But she still has an area of bruising and hematoma over the right shin.  She denies any fevers or chills.  No myalgias, weakness or constitutional symptoms.  No numbness, tingling or weakness.  There has been no drainage from the wound.    Nursing notes reviewed.    REVIEW OF SYSTEMS     Review of Systems  Unless otherwise stated, a complete review of systems was asked of the patient. Pertinent positives are noted in the HPI section.    PAST MEDICAL HISTORY     Past Medical History:   Diagnosis Date    Anxiety     Atrial fibrillation (HCC) 2021 Dr Peng    Dysphagia     Eczema 2021 Dr. Be    Insomnia     contract signed 11/2017    Macular degeneration     Osteoarthritis     Pancreatitis 2010    Pneumonia 2016    PVD (peripheral vascular disease) ([facility])     SIADH (syndrome of inappropriate ADH production) ([facility])     Vertigo        SURGICAL HISTORY       Past Surgical History:   Procedure Laterality Date    CHOLECYSTECTOMY      GYN         CURRENT MEDICATIONS       Discharge Medication List as of 2/3/2024  6:19 PM        CONTINUE these medications which have NOT CHANGED    Details   cefpodoxime (VANTIN) 100 MG tablet TAKE 1 TABLET BY MOUTH EVERY 12

## 2024-02-08 ENCOUNTER — TELEPHONE (OUTPATIENT)
Facility: CLINIC | Age: 89
End: 2024-02-08

## 2024-02-08 NOTE — TELEPHONE ENCOUNTER
Krystina justice PT with [facility] wanted to let provider know that they have opened Physical Therapy for the pt starting at 2 times a week for 5 weeks and will re evaluate after.

## 2024-03-11 ENCOUNTER — TELEPHONE (OUTPATIENT)
Facility: CLINIC | Age: 89
End: 2024-03-11

## 2024-03-11 NOTE — TELEPHONE ENCOUNTER
Pt has a on chin that is not healing. Daughter would like PCP to look at it or work her to be seen so she can have a referral to a wound clinic

## 2024-03-18 ENCOUNTER — OFFICE VISIT (OUTPATIENT)
Facility: CLINIC | Age: 89
End: 2024-03-18
Payer: COMMERCIAL

## 2024-03-18 VITALS
RESPIRATION RATE: 16 BRPM | HEIGHT: 60 IN | BODY MASS INDEX: 22.19 KG/M2 | WEIGHT: 113 LBS | DIASTOLIC BLOOD PRESSURE: 73 MMHG | OXYGEN SATURATION: 95 % | HEART RATE: 68 BPM | TEMPERATURE: 97.8 F | SYSTOLIC BLOOD PRESSURE: 124 MMHG

## 2024-03-18 DIAGNOSIS — L03.115 CELLULITIS OF RIGHT LOWER EXTREMITY: ICD-10-CM

## 2024-03-18 DIAGNOSIS — R60.0 LOCALIZED EDEMA: Primary | ICD-10-CM

## 2024-03-18 PROCEDURE — 1123F ACP DISCUSS/DSCN MKR DOCD: CPT | Performed by: NURSE PRACTITIONER

## 2024-03-18 PROCEDURE — 99213 OFFICE O/P EST LOW 20 MIN: CPT | Performed by: NURSE PRACTITIONER

## 2024-03-18 ASSESSMENT — PATIENT HEALTH QUESTIONNAIRE - PHQ9
SUM OF ALL RESPONSES TO PHQ9 QUESTIONS 1 & 2: 0
SUM OF ALL RESPONSES TO PHQ QUESTIONS 1-9: 0
2. FEELING DOWN, DEPRESSED OR HOPELESS: NOT AT ALL
SUM OF ALL RESPONSES TO PHQ QUESTIONS 1-9: 0
SUM OF ALL RESPONSES TO PHQ QUESTIONS 1-9: 0
1. LITTLE INTEREST OR PLEASURE IN DOING THINGS: NOT AT ALL
SUM OF ALL RESPONSES TO PHQ QUESTIONS 1-9: 0

## 2024-03-18 NOTE — PROGRESS NOTES
1. Have you been to the ER, urgent care clinic since your last visit?  Hospitalized since your last visit?No    2. Have you seen or consulted any other health care providers outside of the [facility] since your last visit?  Include any pap smears or colon screening. No    Chief Complaint   Patient presents with    Wound Check     Located on check     Health Maintenance Due   Topic Date Due    DTaP/Tdap/Td vaccine (1 - Tdap) Never done    COVID-19 Vaccine (6 - 2023-24 season) 11/15/2023     PHQ-9 Total Score: 0 (3/18/2024 10:49 AM)        3/18/2024    10:00 AM   AMB Abuse Screening   Do you ever feel afraid of your partner? N   Are you in a relationship with someone who physically or mentally threatens you? N   Is it safe for you to go home? Y         3/18/2024    10:49 AM   Amb Fall Risk Assessment and TUG Test   Do you feel unsteady or are you worried about falling?  no   2 or more falls in past year? no   Fall with injury in past year? no     Vitals:    03/18/24 1049   BP: 124/73   Pulse: 68   Resp: 16   Temp: 97.8 °F (36.6 °C)   SpO2: 95%         
    No results found for this visit on 03/18/24.

## 2024-03-18 NOTE — PATIENT INSTRUCTIONS
Wear compression hose  Wash with soap and water, apply neosporin  daily  Follow up with cardiology in regards to fluid retention  Elevate feet when sitting

## 2024-03-24 DIAGNOSIS — E03.9 HYPOTHYROIDISM, UNSPECIFIED TYPE: ICD-10-CM

## 2024-03-25 RX ORDER — LEVOTHYROXINE SODIUM 0.03 MG/1
25 TABLET ORAL
Qty: 90 TABLET | Refills: 1 | Status: SHIPPED | OUTPATIENT
Start: 2024-03-25

## 2024-03-25 NOTE — TELEPHONE ENCOUNTER
PCP: Nima Mackenzie MD    Last appt: [unfilled]  No future appointments.    Requested Prescriptions     Pending Prescriptions Disp Refills    levothyroxine (SYNTHROID) 25 MCG tablet [Pharmacy Med Name: LEVOTHYROXINE 0.025MG (25MCG) TAB] 90 tablet 1     Sig: TAKE 1 TABLET BY MOUTH EVERY MORNING BEFORE BREAKFAST       Prior labs and Blood pressures:  BP Readings from Last 3 Encounters:   03/18/24 124/73   02/03/24 (!) 182/87   01/18/24 123/71     Lab Results   Component Value Date/Time     09/06/2023 12:18 PM    K 3.4 09/06/2023 12:18 PM    CL 98 09/06/2023 12:18 PM    CO2 32 09/06/2023 12:18 PM    BUN 17 09/06/2023 12:18 PM    GFRAA >60 04/26/2021 01:33 PM     No results found for: \"HBA1C\", \"GJB7TPOH\"  No results found for: \"CHOL\", \"CHOLPOCT\", \"CHOLX\", \"CHLST\", \"CHOLV\", \"HDL\", \"HDLPOC\", \"HDLC\", \"LDL\", \"LDLC\", \"VLDLC\", \"VLDL\", \"TGLX\", \"TRIGL\"  No results found for: \"VITD3\", \"VD3RIA\"    Lab Results   Component Value Date/Time    TSH 2.06 11/16/2022 11:21 AM

## 2024-05-18 ENCOUNTER — OFFICE VISIT (OUTPATIENT)
Age: 89
End: 2024-05-18

## 2024-05-18 VITALS
SYSTOLIC BLOOD PRESSURE: 148 MMHG | BODY MASS INDEX: 21.79 KG/M2 | TEMPERATURE: 98.1 F | DIASTOLIC BLOOD PRESSURE: 72 MMHG | WEIGHT: 111 LBS | RESPIRATION RATE: 16 BRPM | HEART RATE: 91 BPM | HEIGHT: 60 IN | OXYGEN SATURATION: 97 %

## 2024-05-18 DIAGNOSIS — L03.115 CELLULITIS OF RIGHT LOWER LEG: ICD-10-CM

## 2024-05-18 DIAGNOSIS — L30.9 DERMATITIS: Primary | ICD-10-CM

## 2024-05-18 RX ORDER — TRIAMCINOLONE ACETONIDE 1 MG/G
CREAM TOPICAL
Qty: 15 G | Refills: 0 | Status: SHIPPED | OUTPATIENT
Start: 2024-05-18

## 2024-06-25 ENCOUNTER — OFFICE VISIT (OUTPATIENT)
Facility: CLINIC | Age: 89
End: 2024-06-25
Payer: COMMERCIAL

## 2024-06-25 VITALS
HEART RATE: 76 BPM | BODY MASS INDEX: 21.79 KG/M2 | DIASTOLIC BLOOD PRESSURE: 64 MMHG | WEIGHT: 111 LBS | TEMPERATURE: 97.9 F | OXYGEN SATURATION: 96 % | RESPIRATION RATE: 20 BRPM | SYSTOLIC BLOOD PRESSURE: 128 MMHG | HEIGHT: 60 IN

## 2024-06-25 DIAGNOSIS — M25.572 CHRONIC PAIN OF LEFT ANKLE: Primary | ICD-10-CM

## 2024-06-25 DIAGNOSIS — Z91.81 AT HIGH RISK FOR FALLS: ICD-10-CM

## 2024-06-25 DIAGNOSIS — S91.302A OPEN WOUND OF LEFT FOOT, INITIAL ENCOUNTER: ICD-10-CM

## 2024-06-25 DIAGNOSIS — L30.9 DERMATITIS: ICD-10-CM

## 2024-06-25 DIAGNOSIS — G89.29 CHRONIC PAIN OF LEFT ANKLE: Primary | ICD-10-CM

## 2024-06-25 PROCEDURE — 1123F ACP DISCUSS/DSCN MKR DOCD: CPT | Performed by: FAMILY MEDICINE

## 2024-06-25 PROCEDURE — 99213 OFFICE O/P EST LOW 20 MIN: CPT | Performed by: FAMILY MEDICINE

## 2024-06-25 NOTE — PROGRESS NOTES
dentified pt with two pt identifiers(name and ).    Chief Complaint   Patient presents with    Follow-up     Patient here for a 3 months follow up.  Also, rash - chest area, was on wrist, Dermatologist has evaluated this month.    Also, check wound on both feet.        Health Maintenance Due   Topic    DTaP/Tdap/Td vaccine (1 - Tdap)    COVID-19 Vaccine ( season)       Wt Readings from Last 3 Encounters:   24 50.3 kg (111 lb)   24 50.3 kg (111 lb)   24 51.3 kg (113 lb)     Temp Readings from Last 3 Encounters:   24 97.9 °F (36.6 °C) (Temporal)   24 98.1 °F (36.7 °C) (Oral)   24 97.8 °F (36.6 °C) (Skin)     BP Readings from Last 3 Encounters:   24 128/64   24 (!) 148/72   24 124/73     Pulse Readings from Last 3 Encounters:   24 76   24 91   24 68           Coordination of Care Questionnaire:  :   1. \"Have you been to the ER, urgent care clinic since your last visit?  Hospitalized since your last visit?\" no    2. \"Have you seen or consulted any other health care providers outside of the [facility] since your last visit?\" no     3. For patients aged 45-75: Has the patient had a colonoscopy / FIT/ Cologuard? no      If the patient is female:    4. For patients aged 40-74: Has the patient had a mammogram within the past 2 years? no      5. For patients aged 21-65: Has the patient had a pap smear? no     3) Do you have an Advance Directive on file? no  Are you interested in receiving information about Advance Directives? no    Patient is accompanied by Daughter N Law I have received verbal consent from Alyson Arriaga to discuss any/all medical information while they are present in the room.

## 2024-06-25 NOTE — PROGRESS NOTES
Alyson Ariraga  89 y.o. female  11/6/1923  MRN:[id number]  [facility]  Progress Note     Encounter Date: 6/25/2024    Assessment and Plan:       ICD-10-CM    1. Chronic pain of left ankle  M25.572     G89.29       2. At high risk for falls  Z91.81       3. Open wound of left foot, initial encounter  S91.302A       4. Dermatitis  L30.9         1. Chronic pain of left ankle  Avoid prolonged standing when using her walker  Has ongoing severe DJD ankle  I have declined to give her hydrocodone.  I think the risk of fall is too high if she takes it.  2. At high risk for falls  3. Open wound of left foot, initial encounter  Looks like a blister and hematoma, now open but healing well.  Soap and water, avoid reinjury.  Keep padded  4. Dermatitis  She has Kenalog 0.1% on her med list.  To use this on rash and call me if she finds she does not have this at home.       I have discussed the diagnosis with the patient and the intended plan as seen in the above orders.  she has expressed understanding.  The patient has received an after-visit summary and questions were answered concerning future plans.  I have discussed medication side effects and warnings with the patient as well.    Electronically Signed: Nima Mackenzie MD    Current Medications after this visit     Current Outpatient Medications   Medication Sig    triamcinolone (KENALOG) 0.1 % cream Apply a thin layer topically 2 times daily. Do note use for more than 14 consecutive days.    levothyroxine (SYNTHROID) 25 MCG tablet TAKE 1 TABLET BY MOUTH EVERY MORNING BEFORE BREAKFAST    metoprolol tartrate (LOPRESSOR) 25 MG tablet Take 1 tablet by mouth 2 times daily    Polyethyl Glycol-Propyl Glycol (SYSTANE OP) Apply to eye    potassium chloride (KLOR-CON M) 20 MEQ extended release tablet TAKE 1 TABLET BY MOUTH ON MONDAY, WEDNESDAY, FRIDAY WITH FOOD    acetaminophen (TYLENOL) 650 MG extended release tablet Take by mouth every 8 hours

## 2024-07-12 ENCOUNTER — OFFICE VISIT (OUTPATIENT)
Age: 89
End: 2024-07-12

## 2024-07-12 VITALS
HEIGHT: 60 IN | DIASTOLIC BLOOD PRESSURE: 69 MMHG | HEART RATE: 78 BPM | TEMPERATURE: 97.7 F | WEIGHT: 111.8 LBS | SYSTOLIC BLOOD PRESSURE: 111 MMHG | RESPIRATION RATE: 16 BRPM | OXYGEN SATURATION: 94 % | BODY MASS INDEX: 21.95 KG/M2

## 2024-07-12 DIAGNOSIS — R21 RASH AND NONSPECIFIC SKIN ERUPTION: Primary | ICD-10-CM

## 2024-07-12 DIAGNOSIS — I10 HYPERTENSION, UNSPECIFIED TYPE: ICD-10-CM

## 2024-07-12 DIAGNOSIS — R03.0 ELEVATED BLOOD PRESSURE READING: ICD-10-CM

## 2024-07-12 RX ORDER — METHYLPREDNISOLONE 4 MG/1
TABLET ORAL
Qty: 1 KIT | Refills: 0 | Status: SHIPPED | OUTPATIENT
Start: 2024-07-12 | End: 2024-07-18

## 2024-08-28 ENCOUNTER — TELEPHONE (OUTPATIENT)
Facility: CLINIC | Age: 89
End: 2024-08-28

## 2024-09-16 SDOH — ECONOMIC STABILITY: FOOD INSECURITY: WITHIN THE PAST 12 MONTHS, YOU WORRIED THAT YOUR FOOD WOULD RUN OUT BEFORE YOU GOT MONEY TO BUY MORE.: NEVER TRUE

## 2024-09-16 SDOH — ECONOMIC STABILITY: FOOD INSECURITY: WITHIN THE PAST 12 MONTHS, THE FOOD YOU BOUGHT JUST DIDN'T LAST AND YOU DIDN'T HAVE MONEY TO GET MORE.: NEVER TRUE

## 2024-09-16 SDOH — ECONOMIC STABILITY: INCOME INSECURITY: HOW HARD IS IT FOR YOU TO PAY FOR THE VERY BASICS LIKE FOOD, HOUSING, MEDICAL CARE, AND HEATING?: NOT VERY HARD

## 2024-09-16 SDOH — ECONOMIC STABILITY: TRANSPORTATION INSECURITY
IN THE PAST 12 MONTHS, HAS LACK OF TRANSPORTATION KEPT YOU FROM MEETINGS, WORK, OR FROM GETTING THINGS NEEDED FOR DAILY LIVING?: NO

## 2024-09-19 ENCOUNTER — OFFICE VISIT (OUTPATIENT)
Facility: CLINIC | Age: 89
End: 2024-09-19

## 2024-09-19 VITALS
TEMPERATURE: 97.6 F | BODY MASS INDEX: 22.38 KG/M2 | OXYGEN SATURATION: 95 % | SYSTOLIC BLOOD PRESSURE: 135 MMHG | WEIGHT: 114 LBS | RESPIRATION RATE: 20 BRPM | DIASTOLIC BLOOD PRESSURE: 80 MMHG | HEIGHT: 60 IN | HEART RATE: 82 BPM

## 2024-09-19 DIAGNOSIS — F32.A DEPRESSION, UNSPECIFIED DEPRESSION TYPE: ICD-10-CM

## 2024-09-19 DIAGNOSIS — I50.9 CHRONIC HEART FAILURE, UNSPECIFIED HEART FAILURE TYPE (HCC): ICD-10-CM

## 2024-09-19 DIAGNOSIS — H61.23 BILATERAL IMPACTED CERUMEN: ICD-10-CM

## 2024-09-19 DIAGNOSIS — M81.0 AGE RELATED OSTEOPOROSIS, UNSPECIFIED PATHOLOGICAL FRACTURE PRESENCE: ICD-10-CM

## 2024-09-19 DIAGNOSIS — I48.91 ATRIAL FIBRILLATION, UNSPECIFIED TYPE (HCC): ICD-10-CM

## 2024-09-19 DIAGNOSIS — R60.0 LOCALIZED EDEMA: ICD-10-CM

## 2024-09-19 DIAGNOSIS — I10 ESSENTIAL HYPERTENSION: Primary | ICD-10-CM

## 2024-09-19 DIAGNOSIS — E03.9 ACQUIRED HYPOTHYROIDISM: ICD-10-CM

## 2024-09-19 RX ORDER — TRAMADOL HYDROCHLORIDE 50 MG/1
1 TABLET ORAL 2 TIMES DAILY PRN
COMMUNITY
End: 2024-09-19

## 2024-09-19 RX ORDER — POLYETHYLENE GLYCOL AND PROPYLENE GLYCOL 4; 3 MG/ML; MG/ML
1 SOLUTION/ DROPS OPHTHALMIC 4 TIMES DAILY PRN
COMMUNITY

## 2024-09-19 RX ORDER — ACETAMINOPHEN 325 MG/1
650 TABLET ORAL EVERY 4 HOURS PRN
COMMUNITY
End: 2024-09-19

## 2024-09-19 RX ORDER — ZOLPIDEM TARTRATE 5 MG/1
5 TABLET ORAL NIGHTLY PRN
COMMUNITY
End: 2024-09-19

## 2024-09-19 ASSESSMENT — PATIENT HEALTH QUESTIONNAIRE - PHQ9
1. LITTLE INTEREST OR PLEASURE IN DOING THINGS: SEVERAL DAYS
8. MOVING OR SPEAKING SO SLOWLY THAT OTHER PEOPLE COULD HAVE NOTICED. OR THE OPPOSITE, BEING SO FIGETY OR RESTLESS THAT YOU HAVE BEEN MOVING AROUND A LOT MORE THAN USUAL: NOT AT ALL
SUM OF ALL RESPONSES TO PHQ9 QUESTIONS 1 & 2: 3
4. FEELING TIRED OR HAVING LITTLE ENERGY: MORE THAN HALF THE DAYS
2. FEELING DOWN, DEPRESSED OR HOPELESS: MORE THAN HALF THE DAYS
5. POOR APPETITE OR OVEREATING: NOT AT ALL
9. THOUGHTS THAT YOU WOULD BE BETTER OFF DEAD, OR OF HURTING YOURSELF: NOT AT ALL
3. TROUBLE FALLING OR STAYING ASLEEP: MORE THAN HALF THE DAYS
6. FEELING BAD ABOUT YOURSELF - OR THAT YOU ARE A FAILURE OR HAVE LET YOURSELF OR YOUR FAMILY DOWN: NOT AT ALL
SUM OF ALL RESPONSES TO PHQ QUESTIONS 1-9: 7
7. TROUBLE CONCENTRATING ON THINGS, SUCH AS READING THE NEWSPAPER OR WATCHING TELEVISION: NOT AT ALL
10. IF YOU CHECKED OFF ANY PROBLEMS, HOW DIFFICULT HAVE THESE PROBLEMS MADE IT FOR YOU TO DO YOUR WORK, TAKE CARE OF THINGS AT HOME, OR GET ALONG WITH OTHER PEOPLE: SOMEWHAT DIFFICULT

## 2024-09-19 ASSESSMENT — ENCOUNTER SYMPTOMS
COUGH: 0
ABDOMINAL PAIN: 0
NAUSEA: 0
VOMITING: 0
RHINORRHEA: 0
SHORTNESS OF BREATH: 0

## 2024-09-25 DIAGNOSIS — E03.9 HYPOTHYROIDISM, UNSPECIFIED TYPE: ICD-10-CM

## 2024-09-26 RX ORDER — LEVOTHYROXINE SODIUM 25 UG/1
25 TABLET ORAL
Qty: 90 TABLET | Refills: 1 | Status: SHIPPED | OUTPATIENT
Start: 2024-09-26

## 2024-10-11 LAB
ALBUMIN SERPL-MCNC: 4.3 G/DL (ref 3.6–4.6)
ALP SERPL-CCNC: 117 IU/L (ref 44–121)
ALT SERPL-CCNC: 12 IU/L (ref 0–32)
AST SERPL-CCNC: 20 IU/L (ref 0–40)
BASOPHILS # BLD AUTO: 0.1 X10E3/UL (ref 0–0.2)
BASOPHILS NFR BLD AUTO: 1 %
BILIRUB SERPL-MCNC: 0.7 MG/DL (ref 0–1.2)
BUN SERPL-MCNC: 18 MG/DL (ref 10–36)
BUN/CREAT SERPL: 30 (ref 12–28)
CALCIUM SERPL-MCNC: 9.4 MG/DL (ref 8.7–10.3)
CHLORIDE SERPL-SCNC: 95 MMOL/L (ref 96–106)
CO2 SERPL-SCNC: 26 MMOL/L (ref 20–29)
CREAT SERPL-MCNC: 0.61 MG/DL (ref 0.57–1)
EGFRCR SERPLBLD CKD-EPI 2021: 80 ML/MIN/1.73
EOSINOPHIL # BLD AUTO: 0.2 X10E3/UL (ref 0–0.4)
EOSINOPHIL NFR BLD AUTO: 2 %
ERYTHROCYTE [DISTWIDTH] IN BLOOD BY AUTOMATED COUNT: 13.8 % (ref 11.7–15.4)
GLOBULIN SER CALC-MCNC: 3 G/DL (ref 1.5–4.5)
GLUCOSE SERPL-MCNC: 102 MG/DL (ref 70–99)
HCT VFR BLD AUTO: 39.8 % (ref 34–46.6)
HGB BLD-MCNC: 12.7 G/DL (ref 11.1–15.9)
IMM GRANULOCYTES # BLD AUTO: 0 X10E3/UL (ref 0–0.1)
IMM GRANULOCYTES NFR BLD AUTO: 0 %
LYMPHOCYTES # BLD AUTO: 1.7 X10E3/UL (ref 0.7–3.1)
LYMPHOCYTES NFR BLD AUTO: 18 %
MCH RBC QN AUTO: 29.9 PG (ref 26.6–33)
MCHC RBC AUTO-ENTMCNC: 31.9 G/DL (ref 31.5–35.7)
MCV RBC AUTO: 94 FL (ref 79–97)
MONOCYTES # BLD AUTO: 0.8 X10E3/UL (ref 0.1–0.9)
MONOCYTES NFR BLD AUTO: 9 %
NEUTROPHILS # BLD AUTO: 6.7 X10E3/UL (ref 1.4–7)
NEUTROPHILS NFR BLD AUTO: 70 %
PLATELET # BLD AUTO: 383 X10E3/UL (ref 150–450)
POTASSIUM SERPL-SCNC: 4.3 MMOL/L (ref 3.5–5.2)
PROT SERPL-MCNC: 7.3 G/DL (ref 6–8.5)
RBC # BLD AUTO: 4.25 X10E6/UL (ref 3.77–5.28)
SODIUM SERPL-SCNC: 139 MMOL/L (ref 134–144)
TSH SERPL DL<=0.005 MIU/L-ACNC: 3.49 UIU/ML (ref 0.45–4.5)
WBC # BLD AUTO: 9.6 X10E3/UL (ref 3.4–10.8)

## 2024-11-22 ENCOUNTER — OFFICE VISIT (OUTPATIENT)
Age: 89
End: 2024-11-22

## 2024-11-22 VITALS
BODY MASS INDEX: 19.5 KG/M2 | WEIGHT: 114.2 LBS | RESPIRATION RATE: 16 BRPM | OXYGEN SATURATION: 97 % | TEMPERATURE: 97.7 F | HEIGHT: 64 IN | HEART RATE: 83 BPM | SYSTOLIC BLOOD PRESSURE: 137 MMHG | DIASTOLIC BLOOD PRESSURE: 85 MMHG

## 2024-11-22 DIAGNOSIS — R82.90 URINE ABNORMALITY: ICD-10-CM

## 2024-11-22 DIAGNOSIS — N30.01 ACUTE CYSTITIS WITH HEMATURIA: Primary | ICD-10-CM

## 2024-11-22 LAB
BILIRUBIN, URINE, POC: NEGATIVE
BLOOD URINE, POC: ABNORMAL
GLUCOSE URINE, POC: NEGATIVE
KETONES, URINE, POC: NEGATIVE
LEUKOCYTE ESTERASE, URINE, POC: ABNORMAL
NITRITE, URINE, POC: POSITIVE
PH, URINE, POC: 6 (ref 4.6–8)
PROTEIN,URINE, POC: NEGATIVE
SPECIFIC GRAVITY, URINE, POC: 1.02 (ref 1–1.03)
URINALYSIS CLARITY, POC: ABNORMAL
URINALYSIS COLOR, POC: YELLOW
UROBILINOGEN, POC: NORMAL MG/DL

## 2024-11-22 RX ORDER — CEPHALEXIN 500 MG/1
500 CAPSULE ORAL 3 TIMES DAILY
Qty: 21 CAPSULE | Refills: 0 | Status: SHIPPED | OUTPATIENT
Start: 2024-11-22 | End: 2024-11-29

## 2024-11-22 NOTE — PROGRESS NOTES
sounds: Normal breath sounds. No wheezing, rhonchi or rales.   Abdominal:      General: There is no distension.      Tenderness: There is no abdominal tenderness. There is no right CVA tenderness, left CVA tenderness or guarding.   Skin:     General: Skin is warm and dry.   Neurological:      General: No focal deficit present.      Mental Status: She is alert and oriented to person, place, and time.   Psychiatric:         Mood and Affect: Mood normal.         Behavior: Behavior normal.         Thought Content: Thought content normal.         Judgment: Judgment normal.          ASSESSMENT/PLAN:  1. Urine abnormality  -     AMB POC URINALYSIS DIP STICK MANUAL W/O MICRO        Urinalysis positive for trace blood, nitrites, and leukocyte esterase  Your urinalysis was positive for infection today and you have been prescribed an antibiotic.   - Keflex, three times daily for 7 days    There are some things you can do at home to help relieve your symptoms while giving the antibiotic time to work (follow age appropriate package dosing instructions for over-the-counter medications):    - Drink plenty of fluids (especially water) to help dilute your urine and aid in flushing out bacteria  - Avoid drinks that may irritate your bladder (coffee, alcohol, citrus juice)    Please contact your Primary Care Provider (PCP) to schedule a follow-up appointment.    If you have worsening symptoms, such as fever, flank pain, or vomiting, proceed to the ER for further evaluation.    Patient comfortable with plan       An electronic signature was used to authenticate this note.    Sarah Angeles PA-C

## 2024-11-22 NOTE — PATIENT INSTRUCTIONS
Your urinalysis was positive for infection today and you have been prescribed an antibiotic.   - Keflex, three times daily for 7 days    There are some things you can do at home to help relieve your symptoms while giving the antibiotic time to work (follow age appropriate package dosing instructions for over-the-counter medications):    - Drink plenty of fluids (especially water) to help dilute your urine and aid in flushing out bacteria  - Avoid drinks that may irritate your bladder (coffee, alcohol, citrus juice)    Please contact your Primary Care Provider (PCP) to schedule a follow-up appointment.    If you have worsening symptoms, such as fever, flank pain, or vomiting, proceed to the ER for further evaluation.

## 2025-03-21 ENCOUNTER — TELEPHONE (OUTPATIENT)
Facility: CLINIC | Age: 89
End: 2025-03-21

## 2025-03-21 NOTE — TELEPHONE ENCOUNTER
Nancy w/ [facility] called needing a new home health order for this patient.  Nancy's call back number for an questions is [phone]

## 2025-03-31 ENCOUNTER — TELEPHONE (OUTPATIENT)
Facility: CLINIC | Age: 89
End: 2025-03-31

## 2025-03-31 NOTE — TELEPHONE ENCOUNTER
Pt was opened for PT need to set appt. Krystina [phone] from [facility]  Nurse to contact patient

## 2025-04-03 DIAGNOSIS — E03.9 HYPOTHYROIDISM, UNSPECIFIED TYPE: ICD-10-CM

## 2025-04-04 RX ORDER — LEVOTHYROXINE SODIUM 25 UG/1
25 TABLET ORAL
Qty: 90 TABLET | Refills: 1 | Status: SHIPPED | OUTPATIENT
Start: 2025-04-04

## 2025-04-10 ENCOUNTER — OFFICE VISIT (OUTPATIENT)
Facility: CLINIC | Age: 89
End: 2025-04-10
Payer: COMMERCIAL

## 2025-04-10 VITALS
TEMPERATURE: 97 F | BODY MASS INDEX: 19.29 KG/M2 | WEIGHT: 113 LBS | HEIGHT: 64 IN | DIASTOLIC BLOOD PRESSURE: 74 MMHG | OXYGEN SATURATION: 93 % | HEART RATE: 73 BPM | RESPIRATION RATE: 20 BRPM | SYSTOLIC BLOOD PRESSURE: 132 MMHG

## 2025-04-10 DIAGNOSIS — Z91.81 AT HIGH RISK FOR FALLS: ICD-10-CM

## 2025-04-10 DIAGNOSIS — M25.572 CHRONIC PAIN OF LEFT ANKLE: ICD-10-CM

## 2025-04-10 DIAGNOSIS — G89.29 CHRONIC PAIN OF LEFT ANKLE: ICD-10-CM

## 2025-04-10 DIAGNOSIS — M81.0 AGE RELATED OSTEOPOROSIS, UNSPECIFIED PATHOLOGICAL FRACTURE PRESENCE: Primary | ICD-10-CM

## 2025-04-10 PROCEDURE — 1123F ACP DISCUSS/DSCN MKR DOCD: CPT | Performed by: NURSE PRACTITIONER

## 2025-04-10 PROCEDURE — 1160F RVW MEDS BY RX/DR IN RCRD: CPT | Performed by: NURSE PRACTITIONER

## 2025-04-10 PROCEDURE — 99213 OFFICE O/P EST LOW 20 MIN: CPT | Performed by: NURSE PRACTITIONER

## 2025-04-10 PROCEDURE — 1159F MED LIST DOCD IN RCRD: CPT | Performed by: NURSE PRACTITIONER

## 2025-04-10 RX ORDER — HYDROCODONE BITARTRATE AND ACETAMINOPHEN 5; 325 MG/1; MG/1
1 TABLET ORAL 3 TIMES DAILY
COMMUNITY
Start: 2025-02-13

## 2025-04-10 SDOH — ECONOMIC STABILITY: FOOD INSECURITY: WITHIN THE PAST 12 MONTHS, YOU WORRIED THAT YOUR FOOD WOULD RUN OUT BEFORE YOU GOT MONEY TO BUY MORE.: NEVER TRUE

## 2025-04-10 SDOH — ECONOMIC STABILITY: FOOD INSECURITY: WITHIN THE PAST 12 MONTHS, THE FOOD YOU BOUGHT JUST DIDN'T LAST AND YOU DIDN'T HAVE MONEY TO GET MORE.: NEVER TRUE

## 2025-04-10 ASSESSMENT — ENCOUNTER SYMPTOMS
EYES NEGATIVE: 1
GASTROINTESTINAL NEGATIVE: 1
RESPIRATORY NEGATIVE: 1
ALLERGIC/IMMUNOLOGIC NEGATIVE: 1

## 2025-04-10 ASSESSMENT — PATIENT HEALTH QUESTIONNAIRE - PHQ9
4. FEELING TIRED OR HAVING LITTLE ENERGY: NOT AT ALL
6. FEELING BAD ABOUT YOURSELF - OR THAT YOU ARE A FAILURE OR HAVE LET YOURSELF OR YOUR FAMILY DOWN: NOT AT ALL
2. FEELING DOWN, DEPRESSED OR HOPELESS: NOT AT ALL
SUM OF ALL RESPONSES TO PHQ QUESTIONS 1-9: 0
5. POOR APPETITE OR OVEREATING: NOT AT ALL
9. THOUGHTS THAT YOU WOULD BE BETTER OFF DEAD, OR OF HURTING YOURSELF: NOT AT ALL
8. MOVING OR SPEAKING SO SLOWLY THAT OTHER PEOPLE COULD HAVE NOTICED. OR THE OPPOSITE, BEING SO FIGETY OR RESTLESS THAT YOU HAVE BEEN MOVING AROUND A LOT MORE THAN USUAL: NOT AT ALL
SUM OF ALL RESPONSES TO PHQ QUESTIONS 1-9: 0
10. IF YOU CHECKED OFF ANY PROBLEMS, HOW DIFFICULT HAVE THESE PROBLEMS MADE IT FOR YOU TO DO YOUR WORK, TAKE CARE OF THINGS AT HOME, OR GET ALONG WITH OTHER PEOPLE: NOT DIFFICULT AT ALL
1. LITTLE INTEREST OR PLEASURE IN DOING THINGS: NOT AT ALL
3. TROUBLE FALLING OR STAYING ASLEEP: NOT AT ALL
7. TROUBLE CONCENTRATING ON THINGS, SUCH AS READING THE NEWSPAPER OR WATCHING TELEVISION: NOT AT ALL
SUM OF ALL RESPONSES TO PHQ QUESTIONS 1-9: 0
SUM OF ALL RESPONSES TO PHQ QUESTIONS 1-9: 0

## 2025-04-10 NOTE — PROGRESS NOTES
Assessment/Plan:     Alyson was seen today for follow-up.    Diagnoses and all orders for this visit:    Age related osteoporosis, unspecified pathological fracture presence  -     External Referral To Physical Therapy    Chronic pain of left ankle  -     External Referral To Physical Therapy    At high risk for falls  -     External Referral To Physical Therapy  Patient was seen in office today to get referral for home health to provide physical therapy.  Patient was needing a referral form for this.  She is working on better strengthening of her ankle legs arms.  Has history of osteoporosis and had a higher risk for falls due to age.  She does use a walker and lives at home alone.  Referral was provided today to at home physical therapy as requested      No follow-up provider specified.    Discussed expected course/resolution/complications of diagnosis in detail with patient.    Medication risks/benefits/costs/interactions/alternatives discussed with patient.    Pt was given after visit summary which includes diagnoses, current medications & vitals.   Pt expressed understanding with the diagnosis and plan        Subjective:      Alyson Arriaga is a 89 y.o. female who presents for had concerns including Follow-up (Patient here today for [facility] order for PT./Strengthing exercises, ankles are weak.).     Current Outpatient Medications   Medication Sig Dispense Refill   • diclofenac sodium (VOLTAREN) 1 % GEL Apply topically as needed     • HYDROcodone-acetaminophen (NORCO) 5-325 MG per tablet Take 1 tablet by mouth 3 times daily.     • levothyroxine (SYNTHROID) 25 MCG tablet TAKE 1 TABLET BY MOUTH EVERY MORNING BEFORE BREAKFAST 90 tablet 1   • Multiple Vitamins-Minerals (PRESERVISION AREDS 2+MULTI VIT PO) Take 1 tablet by mouth daily     • polyethyl glyc-propyl glyc PF (SYSTANE PRESERVATIVE FREE) 0.4-0.3 % SOLN ophthalmic solution Apply 1 drop to eye 4 times daily as needed     • triamcinolone (KENALOG) 0.1 %

## 2025-04-10 NOTE — PROGRESS NOTES
dentified pt with two pt identifiers(name and ).    Chief Complaint   Patient presents with    Follow-up     Patient here today for [facility] order for PT.  Strengthing exercises, ankles are weak.        Health Maintenance Due   Topic    DTaP/Tdap/Td vaccine (1 - Tdap)    COVID-19 Vaccine ( season)       Wt Readings from Last 3 Encounters:   24 51.8 kg (114 lb 3.2 oz)   24 51.7 kg (114 lb)   24 50.7 kg (111 lb 12.8 oz)     Temp Readings from Last 3 Encounters:   24 97.7 °F (36.5 °C) (Oral)   24 97.6 °F (36.4 °C) (Temporal)   24 97.7 °F (36.5 °C)     BP Readings from Last 3 Encounters:   24 137/85   24 135/80   24 111/69     Pulse Readings from Last 3 Encounters:   24 83   24 82   24 78           Coordination of Care Questionnaire:  :   1. \"Have you been to the ER, urgent care clinic since your last visit?  Hospitalized since your last visit?\" no    2. \"Have you seen or consulted any other health care providers outside of the [facility] since your last visit?\" no     3. For patients aged 45-75: Has the patient had a colonoscopy / FIT/ Cologuard? no      If the patient is female:    4. For patients aged 40-74: Has the patient had a mammogram within the past 2 years? no      5. For patients aged 21-65: Has the patient had a pap smear? no     3) Do you have an Advance Directive on file? no  Are you interested in receiving information about Advance Directives? no    Patient is accompanied by Daughter N Law I have received verbal consent from Alyson Arriaga to discuss any/all medical information while they are present in the room.

## 2025-08-02 ENCOUNTER — OFFICE VISIT (OUTPATIENT)
Age: 89
End: 2025-08-02

## 2025-08-02 VITALS
SYSTOLIC BLOOD PRESSURE: 168 MMHG | HEART RATE: 67 BPM | DIASTOLIC BLOOD PRESSURE: 82 MMHG | WEIGHT: 109.2 LBS | TEMPERATURE: 97.8 F | OXYGEN SATURATION: 96 % | BODY MASS INDEX: 21.44 KG/M2 | HEIGHT: 60 IN | RESPIRATION RATE: 12 BRPM

## 2025-08-02 DIAGNOSIS — R03.0 ELEVATED BLOOD PRESSURE READING: ICD-10-CM

## 2025-08-02 DIAGNOSIS — L30.9 DERMATITIS: Primary | ICD-10-CM

## 2025-08-02 RX ORDER — HYDROCORTISONE 25 MG/G
CREAM TOPICAL
Qty: 3.5 G | Refills: 0 | Status: SHIPPED | OUTPATIENT
Start: 2025-08-02

## 2025-08-02 NOTE — PATIENT INSTRUCTIONS
per serving), or \"low-sodium\" (140 mg or less of sodium per serving). Foods labeled \"reduced-sodium\" and \"light sodium\" may still have too much sodium. Be sure to read the label to see how much sodium you are getting.  Buy fresh vegetables, or frozen vegetables without added sauces. Buy low-sodium versions of canned vegetables, soups, and other canned goods.  Prepare low-sodium meals  Cut back on the amount of salt you use in cooking. This will help you adjust to the taste. Do not add salt after cooking. One teaspoon of salt has about 2,300 mg of sodium.  Take the salt shaker off the table.  Flavor your food with garlic, lemon juice, onion, vinegar, herbs, and spices. Do not use soy sauce, lite soy sauce, steak sauce, onion salt, garlic salt, celery salt, or ketchup on your food.  Use low-sodium salad dressings, sauces, and ketchup. Or make your own salad dressings and sauces without adding salt.  Use less salt (or none) when recipes call for it. You can often use half the salt a recipe calls for without losing flavor. Other foods such as rice, pasta, and grains do not need added salt.  Rinse canned vegetables, and cook them in fresh water. This removes some--but not all--of the salt.  Avoid water that is naturally high in sodium or that has been treated with water softeners, which add sodium. If you buy bottled water, read the label and choose a sodium-free brand.  Avoid high-sodium foods  Avoid eating:  Smoked, cured, salted, and canned meat, fish, and poultry.  Ham, hernandez, hot dogs, and luncheon meats.  Regular, hard, and processed cheese and regular peanut butter.  Crackers with salted tops, and other salted snack foods such as pretzels, chips, and salted popcorn.  Frozen prepared meals, unless labeled low-sodium.  Canned and dried soups, broths, and bouillon, unless labeled sodium-free or low-sodium.  Canned vegetables, unless labeled sodium-free or low-sodium.  French fries, pizza, tacos, and other fast

## 2025-08-02 NOTE — PROGRESS NOTES
degeneration     Osteoarthritis     Pancreatitis 2010    Pneumonia 2016    PVD (peripheral vascular disease)     SIADH (syndrome of inappropriate ADH production)     Vertigo         Past Surgical History:   Procedure Laterality Date    CHOLECYSTECTOMY      GYN          Social History:   Social Connections: Not on file        Patient Care Team:  Nima Mackenzie MD as PCP - General  Nima Mackenzie MD as PCP - Empaneled Provider    Patient Active Problem List   Diagnosis    Other insomnia    Essential hypertension    Acquired hypothyroidism    Atrial fibrillation (HCC)    Ankle arthritis    Chronic heart failure, unspecified heart failure type (HCC)    Age related osteoporosis    Localized edema      I ADVISED PATIENT TO GO TO ER IF SYMPTOMS WORSEN , CHANGE OR FAILS TO IMPROVE.    I have discussed the diagnosis/diagnoses with the patient and the intended plan as indicated in the above orders.  The patient also understands that early in the process of an illness, the urgent care workup can be falsely reassuring. Routine discharge counseling and specific return precautions dicussed with the patient, and the patient understands that worsening, changing or persistent symptoms should prompt an immediate return to an urgent care or emergency department. Patient/Guardian expressed understanding and agrees with the discharge instructions. No further questions at this time of discharge.  The patient has received an after-visit summary and questions were answered concerning future plans.  I have discussed medication side effects and warnings with the patient as well. The patient agrees and understands above plan.     (Please note that parts of this dictation were completed with voice recognition software. Quite often unanticipated grammatical, syntax, homophones, and other interpretive errors are inadvertently transcribed by the computer software. Efforts were made to edit the dictation but occasionally words remain